# Patient Record
Sex: MALE | Race: WHITE | NOT HISPANIC OR LATINO | Employment: OTHER | URBAN - NONMETROPOLITAN AREA
[De-identification: names, ages, dates, MRNs, and addresses within clinical notes are randomized per-mention and may not be internally consistent; named-entity substitution may affect disease eponyms.]

---

## 2023-03-06 ENCOUNTER — OFFICE VISIT (OUTPATIENT)
Dept: INTERNAL MEDICINE | Facility: OTHER | Age: 53
End: 2023-03-06
Attending: STUDENT IN AN ORGANIZED HEALTH CARE EDUCATION/TRAINING PROGRAM
Payer: COMMERCIAL

## 2023-03-06 ENCOUNTER — TELEPHONE (OUTPATIENT)
Dept: INTERNAL MEDICINE | Facility: OTHER | Age: 53
End: 2023-03-06

## 2023-03-06 VITALS
WEIGHT: 196.6 LBS | SYSTOLIC BLOOD PRESSURE: 118 MMHG | TEMPERATURE: 98.9 F | HEART RATE: 70 BPM | DIASTOLIC BLOOD PRESSURE: 62 MMHG | RESPIRATION RATE: 16 BRPM | OXYGEN SATURATION: 99 %

## 2023-03-06 DIAGNOSIS — Z01.818 PREOPERATIVE EXAMINATION: ICD-10-CM

## 2023-03-06 DIAGNOSIS — E87.5 HYPERKALEMIA: Primary | ICD-10-CM

## 2023-03-06 DIAGNOSIS — J44.9 CHRONIC OBSTRUCTIVE PULMONARY DISEASE, UNSPECIFIED COPD TYPE (H): Primary | ICD-10-CM

## 2023-03-06 DIAGNOSIS — Z12.11 ENCOUNTER FOR SCREENING COLONOSCOPY: ICD-10-CM

## 2023-03-06 DIAGNOSIS — C81.10 NODULAR SCLEROSING HODGKIN'S LYMPHOMA, UNSPECIFIED BODY REGION (H): ICD-10-CM

## 2023-03-06 DIAGNOSIS — E87.5 HYPERKALEMIA: ICD-10-CM

## 2023-03-06 DIAGNOSIS — G62.9 NEUROPATHY: ICD-10-CM

## 2023-03-06 PROBLEM — R74.01 TRANSAMINITIS: Status: ACTIVE | Noted: 2021-03-13

## 2023-03-06 PROBLEM — N18.31 STAGE 3A CHRONIC KIDNEY DISEASE (H): Status: ACTIVE | Noted: 2021-03-13

## 2023-03-06 PROBLEM — D72.810 LYMPHOPENIA: Status: ACTIVE | Noted: 2021-03-13

## 2023-03-06 LAB
ANION GAP SERPL CALCULATED.3IONS-SCNC: 7 MMOL/L (ref 7–15)
ATRIAL RATE - MUSE: 60 BPM
BUN SERPL-MCNC: 17.4 MG/DL (ref 6–20)
CALCIUM SERPL-MCNC: 9.8 MG/DL (ref 8.6–10)
CHLORIDE SERPL-SCNC: 104 MMOL/L (ref 98–107)
CREAT SERPL-MCNC: 1.28 MG/DL (ref 0.67–1.17)
DEPRECATED HCO3 PLAS-SCNC: 30 MMOL/L (ref 22–29)
DIASTOLIC BLOOD PRESSURE - MUSE: NORMAL MMHG
ERYTHROCYTE [DISTWIDTH] IN BLOOD BY AUTOMATED COUNT: 13.4 % (ref 10–15)
GFR SERPL CREATININE-BSD FRML MDRD: 67 ML/MIN/1.73M2
GLUCOSE SERPL-MCNC: 101 MG/DL (ref 70–99)
HCT VFR BLD AUTO: 43 % (ref 40–53)
HGB BLD-MCNC: 14.7 G/DL (ref 13.3–17.7)
INTERPRETATION ECG - MUSE: NORMAL
MCH RBC QN AUTO: 30.1 PG (ref 26.5–33)
MCHC RBC AUTO-ENTMCNC: 34.2 G/DL (ref 31.5–36.5)
MCV RBC AUTO: 88 FL (ref 78–100)
P AXIS - MUSE: 60 DEGREES
PLATELET # BLD AUTO: 226 10E3/UL (ref 150–450)
POTASSIUM SERPL-SCNC: 5.6 MMOL/L (ref 3.4–5.3)
PR INTERVAL - MUSE: 138 MS
QRS DURATION - MUSE: 128 MS
QT - MUSE: 432 MS
QTC - MUSE: 432 MS
R AXIS - MUSE: 94 DEGREES
RBC # BLD AUTO: 4.88 10E6/UL (ref 4.4–5.9)
SODIUM SERPL-SCNC: 141 MMOL/L (ref 136–145)
SYSTOLIC BLOOD PRESSURE - MUSE: NORMAL MMHG
T AXIS - MUSE: 57 DEGREES
VENTRICULAR RATE- MUSE: 60 BPM
WBC # BLD AUTO: 8.9 10E3/UL (ref 4–11)

## 2023-03-06 PROCEDURE — 93005 ELECTROCARDIOGRAM TRACING: CPT | Performed by: STUDENT IN AN ORGANIZED HEALTH CARE EDUCATION/TRAINING PROGRAM

## 2023-03-06 PROCEDURE — G0463 HOSPITAL OUTPT CLINIC VISIT: HCPCS

## 2023-03-06 PROCEDURE — 93010 ELECTROCARDIOGRAM REPORT: CPT | Performed by: INTERNAL MEDICINE

## 2023-03-06 PROCEDURE — 82310 ASSAY OF CALCIUM: CPT | Mod: ZL | Performed by: STUDENT IN AN ORGANIZED HEALTH CARE EDUCATION/TRAINING PROGRAM

## 2023-03-06 PROCEDURE — 85027 COMPLETE CBC AUTOMATED: CPT | Mod: ZL | Performed by: STUDENT IN AN ORGANIZED HEALTH CARE EDUCATION/TRAINING PROGRAM

## 2023-03-06 PROCEDURE — 36415 COLL VENOUS BLD VENIPUNCTURE: CPT | Mod: ZL | Performed by: STUDENT IN AN ORGANIZED HEALTH CARE EDUCATION/TRAINING PROGRAM

## 2023-03-06 PROCEDURE — 99204 OFFICE O/P NEW MOD 45 MIN: CPT | Performed by: STUDENT IN AN ORGANIZED HEALTH CARE EDUCATION/TRAINING PROGRAM

## 2023-03-06 RX ORDER — GABAPENTIN 300 MG/1
1 CAPSULE ORAL 3 TIMES DAILY
COMMUNITY
Start: 2022-05-09 | End: 2023-03-06

## 2023-03-06 RX ORDER — GABAPENTIN 100 MG/1
200-300 CAPSULE ORAL 3 TIMES DAILY
Qty: 540 CAPSULE | Refills: 3 | Status: SHIPPED | OUTPATIENT
Start: 2023-03-06

## 2023-03-06 ASSESSMENT — PAIN SCALES - GENERAL: PAINLEVEL: NO PAIN (0)

## 2023-03-06 NOTE — TELEPHONE ENCOUNTER
----- Message from Js Luna MD sent at 3/6/2023 12:41 PM CST -----  Please call patient with results that his potassium was very elevated. This was likely due to hemolysis of the lab sample but needs to be rechecked. I have placed a repeat potassium draw. He may come in for a lab only appointment. I will let him know results when I see them.     Js Luna MD on 3/6/2023 at 12:40 PM

## 2023-03-06 NOTE — TELEPHONE ENCOUNTER
After verifying patient's name and date of birth, patient was given the below information.   Patient understood.    Ramila Herndon LPN....3/6/2023 3:06 PM

## 2023-03-06 NOTE — PROGRESS NOTES
[facility]  [address]  [place] [address]  Phone: [phone]  Primary Provider: No Ref-Primary, Physician  Pre-op Performing Provider: MADHU GIRON      PREOPERATIVE EVALUATION:  Today's date: 3/6/2023    Primo Tierney is a 53 year old male who presents for a preoperative evaluation.    Surgical Information:  Surgery/Procedure: right ear surgery  Surgery Location: [place]   Surgeon: Dr. Carter   Surgery Date: 3-14-23  Time of Surgery: TBD  Where patient plans to recover: At home with family  Fax number for surgical facility: [phone]    Type of Anesthesia Anticipated: Local with MAC          Assessment & Plan         ICD-10-CM    1. Preoperative examination  Z01.818 CBC W PLT No Diff     EKG 12-lead complete w/read - Clinics     Basic Metabolic Panel     CBC W PLT No Diff     Basic Metabolic Panel      2. Nodular sclerosing Hodgkin's lymphoma, unspecified body region (H)  C81.10       3. Chronic obstructive pulmonary disease, unspecified COPD type (H)  J44.9       4. Neuropathy  G62.9 gabapentin (NEURONTIN) 100 MG capsule      5. Encounter for screening colonoscopy  Z12.11 Colonoscopy Screening  Referral        Preoperative examination: Obtain basic labs as he is not had labs in nearly 1 year.  He has no active cardiopulmonary symptoms.  He may continue taking his gabapentin up until the day of surgery.  EKG demonstrates right bundle branch block but otherwise unremarkable for any evidence of ischemia.  He may proceed to testing without any additional work-up.  He is medically optimized for surgery.    Hodgkin's lymphoma: Has resultant neuropathy from chemotherapy and would like to reduce his gabapentin from 300 mg 3 times daily to 200 to 300 mg 3 times daily.  He was given a new prescription of 100 mg tablets for this.    He is due for colonoscopy.  This was ordered today.    Follow-up with me in approximately 3 to 6 months for annual physical exam.      No  follow-ups on file.    Js Luna MD  [facility]    Addendum: Potassium has normalized on recheck, suspect that he had hemolysis of his initial sample.  Patient is medically optimized for surgery.      Subjective     HPI related to upcoming procedure:     Going to have a ear drum surgery.  Patient had a sinus infection that led to a perforated eardrum.  He is going to have eardrum repair on 3-.    Patient has also recently moved to [facility] from [facility] [place].  He has a history of Hodgkin's lymphoma.  He is status postchemotherapy and stem cell transplant.  He has neuropathy as a result.  He is only on gabapentin.  He does not use any blood thinners.  No chest pain or shortness of breath.  He would like to establish care today as well.    He states he just completed his whole childhood immunization regimen as he just had stents of transplant.  He follows with [facility] for his stem cell transplant and hematology oncology at [facility] in [place].        Preop Questions 3/6/2023   1. Have you ever had a heart attack or stroke? No   2. Have you ever had surgery on your heart or blood vessels, such as a stent placement, a coronary artery bypass, or surgery on an artery in your head, neck, heart, or legs? No   3. Do you have chest pain with activity? No   4. Do you have a history of  heart failure? No   5. Do you currently have a cold, bronchitis or symptoms of other infection? No   6. Do you have a cough, shortness of breath, or wheezing? No   7. Do you or anyone in your family have previous history of blood clots? No   8. Do you or does anyone in your family have a serious bleeding problem such as prolonged bleeding following surgeries or cuts? No   9. Have you ever had problems with anemia or been told to take iron pills? No   10. Have you had any abnormal blood loss such as black, tarry or bloody stools? No   11. Have you ever had a blood transfusion? YES -     11a. Have you ever had a transfusion reaction? No   12. Are you willing to have a blood transfusion if it is medically needed before, during, or after your surgery? Yes   13. Have you or any of your relatives ever had problems with anesthesia? No   14. Do you have sleep apnea, excessive snoring or daytime drowsiness? No   15. Do you have any artifical heart valves or other implanted medical devices like a pacemaker, defibrillator, or continuous glucose monitor? No   16. Do you have artificial joints? No   17. Are you allergic to latex? No       Health Care Directive:  Patient does not have a Health Care Directive or Living Will:     Preoperative Review of :   reviewed - no record of controlled substances prescribed.          Review of Systems  CONSTITUTIONAL: NEGATIVE for fever, chills, change in weight  ENT/MOUTH: NEGATIVE for ear, mouth and throat problems  RESP: NEGATIVE for significant cough or SOB  CV: NEGATIVE for chest pain, palpitations or peripheral edema    There are no problems to display for this patient.     No past medical history on file.  No past surgical history on file.  Current Outpatient Medications   Medication Sig Dispense Refill     gabapentin (NEURONTIN) 300 MG capsule Take 1 capsule by mouth 3 times daily         Allergies   Allergen Reactions     Bendamustine Anaphylaxis     Adhesive Tape Dermatitis and Itching     Use op site dressing for PAC     Atovaquone Itching and Other (See Comments)     Rash. Chills within 3 hrs of first dose     Cephalexin Nausea and Vomiting     Per patient       Ciprofloxacin Diarrhea     Erythromycin Nausea     Levofloxacin Diarrhea     Morphine Nausea and Vomiting     Can take with antinausea meds          Social History     Tobacco Use     Smoking status: Some Days     Types: Cigarettes     Smokeless tobacco: Never   Substance Use Topics     Alcohol use: Not Currently     Family History   Problem Relation Age of Onset     Heart Surgery Mother          x4     Uterine Cancer Sister      Melanoma Maternal Grandmother      History   Drug Use     Types: Marijuana         Objective     /62 (BP Location: Right arm, Patient Position: Sitting, Cuff Size: Adult Regular)   Pulse 70   Temp 98.9  F (37.2  C) (Temporal)   Resp 16   Wt 89.2 kg (196 lb 9.6 oz)   SpO2 99%     Physical Exam  Vitals and nursing note reviewed.   Constitutional:       General: He is not in acute distress.     Appearance: He is well-developed. He is not diaphoretic.   HENT:      Head: Normocephalic and atraumatic.   Eyes:      General: No scleral icterus.     Conjunctiva/sclera: Conjunctivae normal.   Cardiovascular:      Rate and Rhythm: Normal rate and regular rhythm.   Pulmonary:      Effort: Pulmonary effort is normal.      Breath sounds: Normal breath sounds. No stridor. No wheezing or rales.   Abdominal:      Palpations: Abdomen is soft.      Tenderness: There is no abdominal tenderness.   Musculoskeletal:         General: No deformity.      Cervical back: Neck supple.   Lymphadenopathy:      Cervical: No cervical adenopathy.   Skin:     General: Skin is warm and dry.      Coloration: Skin is not jaundiced.      Findings: No rash.   Neurological:      General: No focal deficit present.      Mental Status: He is alert. Mental status is at baseline.   Psychiatric:         Mood and Affect: Mood normal.         Behavior: Behavior normal.           No results for input(s): HGB, PLT, INR, NA, POTASSIUM, CR, A1C in the last 11279 hours.     Diagnostics:  Labs pending at this time.  Results will be reviewed when available.   EKG demonstrates normal sinus rhythm with a right bundle branch block.  No evidence of ischemia at this time.  QTc 432    Revised Cardiac Risk Index (RCRI):  The patient has the following serious cardiovascular risks for perioperative complications:   - No serious cardiac risks = 0 points     RCRI Interpretation: 0 points: Class I (very low risk - 0.4% complication  rate)           Signed Electronically by: Js Luna MD  Copy of this evaluation report is provided to requesting physician.

## 2023-03-06 NOTE — NURSING NOTE
Chief Complaint   Patient presents with     Pre-Op Exam     3-14-23  [place]  Dr. Carter  right ear surgery         Medication reconciliation completed.    FOOD SECURITY SCREENING QUESTIONS:    The next two questions are to help us understand your food security.  If you are feeling you need any assistance in this area, we have resources available to support you today.    Hunger Vital Signs:  Within the past 12 months we worried whether our food would run out before we got money to buy more. Never  Within the past 12 months the food we bought just didn't last and we didn't have money to get more. Never    Initial Pulse 70   Temp 98.9  F (37.2  C) (Temporal)   Resp 16   Wt 89.2 kg (196 lb 9.6 oz)   SpO2 99%  There is no height or weight on file to calculate BMI.       Ramila Herndon LPN .......  3/6/2023  10:23 AM

## 2023-03-07 DIAGNOSIS — Z00.00 HEALTHCARE MAINTENANCE: Primary | ICD-10-CM

## 2023-03-07 NOTE — TELEPHONE ENCOUNTER
Screening Questions for the Scheduling of Screening Colonoscopies   (If Colonoscopy is diagnostic, Provider should review the chart before scheduling.)  Are you younger than 50 or older than 80?  NO  Do you take aspirin or fish oil?  NO (if yes, tell patient to stop 1 week prior to Colonoscopy)  Do you take warfarin (Coumadin), clopidogrel (Plavix), apixaban (Eliquis), dabigatram (Pradaxa), rivaroxaban (Xarelto) or any blood thinner? NO  Do you use oxygen at home?  NO  Do you have kidney disease? NO  Are you on dialysis? NO  Have you had a stroke or heart attack in the last year? NO  Have you had a stent in your heart or any blood vessel in the last year? NO  Have you had a transplant of any organ? NO  Have you had a colonoscopy or upper endoscopy (EGD) before? YES         When?  2015  Date of scheduled Colonoscopy. 04/10/2023  Provider TYLER  Pharmacy [facility]

## 2023-03-08 RX ORDER — BISACODYL 5 MG
TABLET, DELAYED RELEASE (ENTERIC COATED) ORAL
Qty: 2 TABLET | Refills: 0 | Status: ON HOLD | OUTPATIENT
Start: 2023-03-08 | End: 2023-04-10

## 2023-03-08 RX ORDER — POLYETHYLENE GLYCOL 3350, SODIUM CHLORIDE, SODIUM BICARBONATE, POTASSIUM CHLORIDE 420; 11.2; 5.72; 1.48 G/4L; G/4L; G/4L; G/4L
4000 POWDER, FOR SOLUTION ORAL ONCE
Qty: 4000 ML | Refills: 0 | Status: SHIPPED | OUTPATIENT
Start: 2023-03-08 | End: 2023-03-08

## 2023-03-09 ENCOUNTER — LAB (OUTPATIENT)
Dept: LAB | Facility: OTHER | Age: 53
End: 2023-03-09
Attending: STUDENT IN AN ORGANIZED HEALTH CARE EDUCATION/TRAINING PROGRAM
Payer: COMMERCIAL

## 2023-03-09 ENCOUNTER — TELEPHONE (OUTPATIENT)
Dept: INTERNAL MEDICINE | Facility: OTHER | Age: 53
End: 2023-03-09

## 2023-03-09 DIAGNOSIS — E87.5 HYPERKALEMIA: ICD-10-CM

## 2023-03-09 LAB — POTASSIUM SERPL-SCNC: 4.1 MMOL/L (ref 3.4–5.3)

## 2023-03-09 PROCEDURE — 84132 ASSAY OF SERUM POTASSIUM: CPT | Mod: ZL

## 2023-03-09 PROCEDURE — 36415 COLL VENOUS BLD VENIPUNCTURE: CPT | Mod: ZL

## 2023-03-09 NOTE — TELEPHONE ENCOUNTER
After verifying patient's name and date of birth, patient was given the below information.      Ramila Herndon LPN....3/9/2023 4:14 PM

## 2023-03-09 NOTE — TELEPHONE ENCOUNTER
----- Message from Js Luna MD sent at 3/9/2023  2:22 PM CST -----  Please call patient with results that his potassium is normal on recheck and he may proceed with surgery as scheduled.     Js Luna MD on 3/9/2023 at 2:21 PM

## 2023-04-03 DIAGNOSIS — Z94.84 H/O STEM CELL TRANSPLANT (H): Primary | ICD-10-CM

## 2023-04-04 PROBLEM — Z00.00 HEALTHCARE MAINTENANCE: Status: ACTIVE | Noted: 2023-04-04

## 2023-04-07 ENCOUNTER — TELEPHONE (OUTPATIENT)
Dept: INTERNAL MEDICINE | Facility: OTHER | Age: 53
End: 2023-04-07
Payer: COMMERCIAL

## 2023-04-07 ENCOUNTER — MEDICAL CORRESPONDENCE (OUTPATIENT)
Dept: HEALTH INFORMATION MANAGEMENT | Facility: OTHER | Age: 53
End: 2023-04-07
Payer: COMMERCIAL

## 2023-04-07 DIAGNOSIS — C81.10 NODULAR SCLEROSING HODGKIN'S LYMPHOMA, UNSPECIFIED BODY REGION (H): Primary | ICD-10-CM

## 2023-04-07 DIAGNOSIS — R59.0 LYMPHADENOPATHY, HILAR: ICD-10-CM

## 2023-04-07 DIAGNOSIS — D72.810 LYMPHOPENIA: ICD-10-CM

## 2023-04-07 DIAGNOSIS — Z94.84 H/O STEM CELL TRANSPLANT (H): ICD-10-CM

## 2023-04-07 NOTE — TELEPHONE ENCOUNTER
"Done, \"go ahead with the PET only, they will address the CT portion.\" Deisi Brooks RN on 4/7/2023 at 2:42 PM    " CBC/PT/PTT/INR/Type and Screen

## 2023-04-07 NOTE — TELEPHONE ENCOUNTER
Thank you for your help. Orders signed. Could you please contact [facility] to let them know that we can do a PET only, no PET CT and if they want the CT portion in addition to PET it will need to be done elsewhere.     Js Luna MD on 4/7/2023 at 1:27 PM      Will send results to [facility]  Attn: [facility]      They should also be available in Epic.

## 2023-04-07 NOTE — TELEPHONE ENCOUNTER
Priscila Mena, [facility], is reaching out to TALI Odom, to help get this scheduled as requested. Orders pending with comments.     Left message for Radiology Scheduling to confirm [facility] PET CT Skull to Thigh FDG - Nodular sclerosis classical hodgkin lymphoma, lymph nodes of multiple sites, comments: Patient has scheduled appointment (virtual) on 5/8. Please push images and written results to [facility] prior to this appointment.    Potential toxicity of this regimen:    Leukopenia - if WBC < 2.0 or neutrophils ,= 1.0 please call   If platelets ,50,000 please call  Deisi Brooks RN on 4/7/2023 at 10:46 AM

## 2023-04-10 ENCOUNTER — ANESTHESIA (OUTPATIENT)
Dept: SURGERY | Facility: OTHER | Age: 53
End: 2023-04-10
Payer: COMMERCIAL

## 2023-04-10 ENCOUNTER — HOSPITAL ENCOUNTER (OUTPATIENT)
Facility: OTHER | Age: 53
Discharge: HOME | End: 2023-04-10
Attending: SURGERY | Admitting: SURGERY
Payer: COMMERCIAL

## 2023-04-10 ENCOUNTER — ANESTHESIA EVENT (OUTPATIENT)
Dept: SURGERY | Facility: OTHER | Age: 53
End: 2023-04-10
Payer: COMMERCIAL

## 2023-04-10 VITALS
TEMPERATURE: 96.4 F | HEART RATE: 60 BPM | RESPIRATION RATE: 14 BRPM | OXYGEN SATURATION: 98 % | DIASTOLIC BLOOD PRESSURE: 60 MMHG | SYSTOLIC BLOOD PRESSURE: 104 MMHG | WEIGHT: 195 LBS

## 2023-04-10 PROBLEM — K63.5 COLON POLYPS: Status: ACTIVE | Noted: 2023-04-10

## 2023-04-10 PROCEDURE — 45384 COLONOSCOPY W/LESION REMOVAL: CPT | Mod: PT | Performed by: SURGERY

## 2023-04-10 PROCEDURE — 258N000003 HC RX IP 258 OP 636: Performed by: SURGERY

## 2023-04-10 PROCEDURE — 88305 TISSUE EXAM BY PATHOLOGIST: CPT

## 2023-04-10 PROCEDURE — 999N000010 HC STATISTIC ANES STAT CODE-CRNA PER MINUTE: Performed by: SURGERY

## 2023-04-10 PROCEDURE — 45385 COLONOSCOPY W/LESION REMOVAL: CPT | Mod: PT | Performed by: SURGERY

## 2023-04-10 PROCEDURE — 45385 COLONOSCOPY W/LESION REMOVAL: CPT | Performed by: NURSE ANESTHETIST, CERTIFIED REGISTERED

## 2023-04-10 PROCEDURE — 250N000009 HC RX 250: Performed by: NURSE ANESTHETIST, CERTIFIED REGISTERED

## 2023-04-10 PROCEDURE — 45378 DIAGNOSTIC COLONOSCOPY: CPT | Performed by: SURGERY

## 2023-04-10 PROCEDURE — 45384 COLONOSCOPY W/LESION REMOVAL: CPT | Mod: PT,XU | Performed by: SURGERY

## 2023-04-10 PROCEDURE — 45385 COLONOSCOPY W/LESION REMOVAL: CPT | Mod: PT

## 2023-04-10 PROCEDURE — 250N000011 HC RX IP 250 OP 636: Performed by: NURSE ANESTHETIST, CERTIFIED REGISTERED

## 2023-04-10 RX ORDER — NALOXONE HYDROCHLORIDE 0.4 MG/ML
0.4 INJECTION, SOLUTION INTRAMUSCULAR; INTRAVENOUS; SUBCUTANEOUS
Status: CANCELLED | OUTPATIENT
Start: 2023-04-10

## 2023-04-10 RX ORDER — NALOXONE HYDROCHLORIDE 0.4 MG/ML
0.2 INJECTION, SOLUTION INTRAMUSCULAR; INTRAVENOUS; SUBCUTANEOUS
Status: CANCELLED | OUTPATIENT
Start: 2023-04-10

## 2023-04-10 RX ORDER — SODIUM CHLORIDE, SODIUM LACTATE, POTASSIUM CHLORIDE, CALCIUM CHLORIDE 600; 310; 30; 20 MG/100ML; MG/100ML; MG/100ML; MG/100ML
INJECTION, SOLUTION INTRAVENOUS CONTINUOUS
Status: DISCONTINUED | OUTPATIENT
Start: 2023-04-10 | End: 2023-04-10 | Stop reason: HOSPADM

## 2023-04-10 RX ORDER — LIDOCAINE 40 MG/G
CREAM TOPICAL
Status: DISCONTINUED | OUTPATIENT
Start: 2023-04-10 | End: 2023-04-10 | Stop reason: HOSPADM

## 2023-04-10 RX ORDER — ACETAMINOPHEN 500 MG
500 TABLET ORAL EVERY 6 HOURS PRN
COMMUNITY

## 2023-04-10 RX ORDER — PROPOFOL 10 MG/ML
INJECTION, EMULSION INTRAVENOUS PRN
Status: DISCONTINUED | OUTPATIENT
Start: 2023-04-10 | End: 2023-04-10

## 2023-04-10 RX ORDER — SODIUM CHLORIDE, SODIUM LACTATE, POTASSIUM CHLORIDE, CALCIUM CHLORIDE 600; 310; 30; 20 MG/100ML; MG/100ML; MG/100ML; MG/100ML
INJECTION, SOLUTION INTRAVENOUS CONTINUOUS
Status: CANCELLED | OUTPATIENT
Start: 2023-04-10

## 2023-04-10 RX ORDER — LIDOCAINE HYDROCHLORIDE 20 MG/ML
INJECTION, SOLUTION INFILTRATION; PERINEURAL PRN
Status: DISCONTINUED | OUTPATIENT
Start: 2023-04-10 | End: 2023-04-10

## 2023-04-10 RX ORDER — ONDANSETRON 2 MG/ML
4 INJECTION INTRAMUSCULAR; INTRAVENOUS
Status: DISCONTINUED | OUTPATIENT
Start: 2023-04-10 | End: 2023-04-10 | Stop reason: HOSPADM

## 2023-04-10 RX ORDER — POLYETHYLENE GLYCOL 3350, SODIUM CHLORIDE, SODIUM BICARBONATE, POTASSIUM CHLORIDE 420; 11.2; 5.72; 1.48 G/4L; G/4L; G/4L; G/4L
420 POWDER, FOR SOLUTION ORAL ONCE
Status: ON HOLD | COMMUNITY
Start: 2023-03-09 | End: 2023-04-10

## 2023-04-10 RX ORDER — PROPOFOL 10 MG/ML
INJECTION, EMULSION INTRAVENOUS CONTINUOUS PRN
Status: DISCONTINUED | OUTPATIENT
Start: 2023-04-10 | End: 2023-04-10

## 2023-04-10 RX ORDER — FLUMAZENIL 0.1 MG/ML
0.2 INJECTION, SOLUTION INTRAVENOUS
Status: CANCELLED | OUTPATIENT
Start: 2023-04-10 | End: 2023-04-10

## 2023-04-10 RX ADMIN — PROPOFOL 60 MG: 10 INJECTION, EMULSION INTRAVENOUS at 10:36

## 2023-04-10 RX ADMIN — SODIUM CHLORIDE, POTASSIUM CHLORIDE, SODIUM LACTATE AND CALCIUM CHLORIDE: 600; 310; 30; 20 INJECTION, SOLUTION INTRAVENOUS at 10:04

## 2023-04-10 RX ADMIN — LIDOCAINE HYDROCHLORIDE 40 MG: 20 INJECTION, SOLUTION INFILTRATION; PERINEURAL at 10:36

## 2023-04-10 RX ADMIN — PROPOFOL 140 MCG/KG/MIN: 10 INJECTION, EMULSION INTRAVENOUS at 10:36

## 2023-04-10 ASSESSMENT — COPD QUESTIONNAIRES: COPD: 1

## 2023-04-10 ASSESSMENT — ACTIVITIES OF DAILY LIVING (ADL): ADLS_ACUITY_SCORE: 35

## 2023-04-10 ASSESSMENT — LIFESTYLE VARIABLES: TOBACCO_USE: 1

## 2023-04-10 NOTE — DISCHARGE INSTRUCTIONS
[facility]  Adult Discharge Orders & Instructions    ________________________________________________________________          For 12 hours after surgery  Get plenty of rest.  A responsible adult must stay with you for at least 12 hours after you leave the hospital.   You may feel lightheaded.  IF so, sit for a few minutes before standing.  Have someone help you get up.   You may have a slight fever. Call the doctor if your fever is over 101 F (38.3 C) (taken under the tongue) or lasts longer than 24 hours.  You may have a dry mouth, a sore throat, muscle aches or trouble sleeping.  These should go away after 24 hours.  Do not make important or legal decisions.  6.   Do not drive or use heavy equipment.  If you have weakness or tingling, don't drive or use heavy equipment until this feeling goes away.    To contact a doctor, call   [phone]_______________________

## 2023-04-10 NOTE — OP NOTE
PROCEDURE NOTE    DATE OF SERVICE: 4/10/2023    SURGEON: Reilly Edmond MD    PRE-OP DIAGNOSIS:      History of Polyps      POST-OP DIAGNOSIS:  Same  Polyps at TC and rectum    PROCEDURE:       Colonoscopy with snare polypectomy and hot biopsy        ANESTHESIA:  JAREK Bangura CRNA    INDICATION FOR THE PROCEDURE: The patient is a 53 year old male with h/o polyps . The patient has no other complaints  . After explaining the risks to include bleeding, perforation, potential inability toreach the cecum, the patient wished to proceed.    PROCEDURE:After adequate sedation, the patient was in the left lateral decubitus position.  Rectal exam was performed.  There was normal tone and no palpable masses .  The colonoscope was introduced into the rectum and advanced to the cecum with Mild difficulty.  The patient's prep was good.  The terminal cecum was reached.  The cecum, ascending, transverse, descending and sigmoid colon was with small 0.4 cm polyp in proximal TC that was hot biopsied and destroyed and 0.7 cm slightly raised polyp in distal TC that was completely removed by snare .  The scope was retroflexed in the rectum.  The rectum was remarkable  for diminutive polyps that were hot biopsied and destroyed   .  The scope was straightened and removed.  The patient tolerated the procedure well.     ESTIMATED BLOOD LOSS: none    COMPLICATIONS:  None    TISSUE REMOVED:  Yes    RECOMMEND:      Follow-up pending pathology      Reilly Edmond MD FACS

## 2023-04-10 NOTE — ANESTHESIA PREPROCEDURE EVALUATION
Anesthesia Pre-Procedure Evaluation    Patient: Primo Tierney   MRN: [id number] : 1970        Procedure : Procedure(s):  Colonoscopy          Past Medical History:   Diagnosis Date     History of peripheral stem cell transplant (H)      Hodgkin lymphoma, nodular sclerosis (H)      Neuropathy       Past Surgical History:   Procedure Laterality Date     BIOPSY/REMOVAL, LYMPH NODE(S)      x2     CHOLECYSTECTOMY       PORTACATH PLACEMENT      Has been removed. Was for chemo      Allergies   Allergen Reactions     Bendamustine Anaphylaxis     Adhesive Tape Dermatitis and Itching     Use op site dressing for PAC     Atovaquone Itching and Other (See Comments)     Rash. Chills within 3 hrs of first dose     Cephalexin Nausea and Vomiting     Per patient       Ciprofloxacin Diarrhea     Erythromycin Nausea     Levofloxacin Diarrhea     Morphine Nausea and Vomiting     Can take with antinausea meds        Social History     Tobacco Use     Smoking status: Some Days     Packs/day: 0.25     Types: Cigarettes     Smokeless tobacco: Never   Vaping Use     Vaping status: Never Used   Substance Use Topics     Alcohol use: Not Currently      Wt Readings from Last 1 Encounters:   04/10/23 88.5 kg (195 lb)        Anesthesia Evaluation   Pt has had prior anesthetic.     No history of anesthetic complications       ROS/MED HX  ENT/Pulmonary:     (+) tobacco use, Current use, COPD,     Neurologic:     (+) peripheral neuropathy,     Cardiovascular:     (+) -----Irregular Heartbeat/Palpitations,     METS/Exercise Tolerance: 4 - Raking leaves, gardening    Hematologic: Comments: Hx Stem Cell Transplant    (+) history of blood transfusion, no previous transfusion reaction,     Musculoskeletal:  - neg musculoskeletal ROS     GI/Hepatic:     (+) bowel prep,     Renal/Genitourinary:     (+) renal disease, type: CRI, Pt does not require dialysis,     Endo:     (+) Obesity,     Psychiatric/Substance Use:  - neg psychiatric ROS      Infectious Disease:  - neg infectious disease ROS     Malignancy:   (+) Malignancy, History of Lymphoma/Leukemia.    Other:  - neg other ROS          Physical Exam    Airway        Mallampati: II   TM distance: > 3 FB   Neck ROM: full   Mouth opening: > 3 cm    Respiratory Devices and Support         Dental     Comment: Upper and lower dentures are in place. Pt wishes to keep them in during the procedure. He understands the risks associated with their dislodgment and accepts the risks.     (+) Edentulous      Cardiovascular   cardiovascular exam normal       Rhythm and rate: regular and normal     Pulmonary   pulmonary exam normal        breath sounds clear to auscultation           OUTSIDE LABS:  CBC:   Lab Results   Component Value Date    WBC 8.9 03/06/2023    HGB 14.7 03/06/2023    HCT 43.0 03/06/2023     03/06/2023     BMP:   Lab Results   Component Value Date     03/06/2023    POTASSIUM 4.1 03/09/2023    POTASSIUM 5.6 (H) 03/06/2023    CHLORIDE 104 03/06/2023    CO2 30 (H) 03/06/2023    BUN 17.4 03/06/2023    CR 1.28 (H) 03/06/2023     (H) 03/06/2023     COAGS: No results found for: PTT, INR, FIBR  POC: No results found for: BGM, HCG, HCGS  HEPATIC: No results found for: ALBUMIN, PROTTOTAL, ALT, AST, GGT, ALKPHOS, BILITOTAL, BILIDIRECT, JM  OTHER:   Lab Results   Component Value Date    TRINA 9.8 03/06/2023       Anesthesia Plan    ASA Status:  3   NPO Status:  NPO Appropriate    Anesthesia Type: MAC.              Consents    Anesthesia Plan(s) and associated risks, benefits, and realistic alternatives discussed. Questions answered and patient/representative(s) expressed understanding.     - Discussed: Risks, Benefits and Alternatives for BOTH SEDATION and the PROCEDURE were discussed     - Discussed with:  Patient      - Extended Intubation/Ventilatory Support Discussed: No.      - Patient is DNR/DNI Status: No    Use of blood products discussed: No .     Postoperative Care             Comments:                DARA Bridges CRNA

## 2023-04-10 NOTE — H&P
History and Physical    CHIEF COMPLAINT / REASON FOR PROCEDURE:  H/o polyps    PERTINENT HISTORY   Patient is a 53 year old male who presents today for colonoscopy for h/o polyps.   Last colonoscopy about 4 years ago.   Patient has no complaints.    Past Medical History:   Diagnosis Date     History of peripheral stem cell transplant (H)      Hodgkin lymphoma, nodular sclerosis (H)      Neuropathy      Past Surgical History:   Procedure Laterality Date     BIOPSY/REMOVAL, LYMPH NODE(S)      x2     CHOLECYSTECTOMY       PORTACATH PLACEMENT      Has been removed. Was for chemo       Bleeding tendencies:  No    ALLERGIES/SENSITIVITIES:   Allergies   Allergen Reactions     Bendamustine Anaphylaxis     Adhesive Tape Dermatitis and Itching     Use op site dressing for PAC     Atovaquone Itching and Other (See Comments)     Rash. Chills within 3 hrs of first dose     Cephalexin Nausea and Vomiting     Per patient       Ciprofloxacin Diarrhea     Erythromycin Nausea     Levofloxacin Diarrhea     Morphine Nausea and Vomiting     Can take with antinausea meds          CURRENT MEDICATIONS:    Prior to Admission medications    Medication Sig Start Date End Date Taking? Authorizing Provider   gabapentin (NEURONTIN) 100 MG capsule Take 2-3 capsules (200-300 mg) by mouth 3 times daily 3/6/23  Yes Js Luna MD   acetaminophen (TYLENOL) 500 MG tablet Take 500 mg by mouth every 6 hours as needed    Reported, Patient       Physical Exam:  /66   Pulse 77   Temp 97.6  F (36.4  C) (Tympanic)   Resp 16   Wt 88.5 kg (195 lb)   SpO2 97%   EXAM:  Chest/Respiratory Exam: Normal - Clear to auscultation without rales, rhonchi, or wheezing.  Cardiovascular Exam: normal, regular rate and rhythm        PLAN: COLONOSCOPY .  Patient understands risks of bleeding, perforation, potential inability to reach cecum, aspiration and wishes to proceed. MAC needed for ASA III.

## 2023-04-10 NOTE — ANESTHESIA POSTPROCEDURE EVALUATION
Patient: Primo Tierney    Procedure: Procedure(s):  Colonoscopy       Anesthesia Type:  MAC    Note:  Disposition: Outpatient   Postop Pain Control: Uneventful            Sign Out: Well controlled pain   PONV: No   Neuro/Psych: Uneventful            Sign Out: Acceptable/Baseline neuro status   Airway/Respiratory: Uneventful            Sign Out: Acceptable/Baseline resp. status   CV/Hemodynamics: Uneventful            Sign Out: Acceptable CV status; No obvious hypovolemia; No obvious fluid overload   Other NRE: NONE   DID A NON-ROUTINE EVENT OCCUR?            Last vitals:  Vitals Value Taken Time   /60 04/10/23 1130   Temp 96.4  F (35.8  C) 04/10/23 1107   Pulse 60 04/10/23 1130   Resp 16 04/10/23 1115   SpO2 97 % 04/10/23 1131   Vitals shown include unvalidated device data.    Electronically Signed By: DARA MARTIN CRNA  April 10, 2023  11:44 AM

## 2023-04-10 NOTE — ANESTHESIA CARE TRANSFER NOTE
Patient: Primo Tierney    Procedure: Procedure(s):  Colonoscopy       Diagnosis: Screening for colon cancer [Z12.11]  Diagnosis Additional Information: No value filed.    Anesthesia Type:   MAC     Note:    Oropharynx: oropharynx clear of all foreign objects  Level of Consciousness: drowsy  Oxygen Supplementation: room air    Independent Airway: airway patency satisfactory and stable    Vital Signs Stable: post-procedure vital signs reviewed and stable  Report to RN Given: handoff report given  Patient transferred to: Phase II    Handoff Report: Identifed the Patient, Identified the Reponsible Provider, Reviewed the pertinent medical history, Discussed the surgical course, Reviewed Intra-OP anesthesia mangement and issues during anesthesia, Set expectations for post-procedure period and Allowed opportunity for questions and acknowledgement of understanding      Vitals:  Vitals Value Taken Time   BP     Temp     Pulse     Resp     SpO2         Electronically Signed By: DARA MARTIN CRNA  April 10, 2023  11:06 AM

## 2023-04-10 NOTE — OR NURSING
Michael has been discharged to home at 1142 via private car accompanied by mom    Written discharge instructions were provided to patient and mom.  Prescriptions were none.  Patient states their pain is denies, and denies any nausea or dizziness upon discharge.    Patient and adult caring for them verbalize understanding of discharge instructions including no driving until tomorrow and no longer taking narcotic pain medications - no operating mechanical equipment and no making any important decisions.They understand reason for discharge, and necessary follow-up appointments.

## 2023-04-11 PROBLEM — Z86.0101 H/O ADENOMATOUS POLYP OF COLON: Status: ACTIVE | Noted: 2023-04-10

## 2023-04-11 PROBLEM — Z00.00 HEALTHCARE MAINTENANCE: Status: RESOLVED | Noted: 2023-04-04 | Resolved: 2023-04-11

## 2023-04-11 LAB
PATH REPORT.COMMENTS IMP SPEC: NORMAL
PATH REPORT.FINAL DX SPEC: NORMAL
PATH REPORT.RELEVANT HX SPEC: NORMAL
PHOTO IMAGE: NORMAL

## 2023-04-18 ENCOUNTER — TELEPHONE (OUTPATIENT)
Dept: PET IMAGING | Facility: HOSPITAL | Age: 53
End: 2023-04-18

## 2023-04-19 ENCOUNTER — HOSPITAL ENCOUNTER (OUTPATIENT)
Dept: PET IMAGING | Facility: HOSPITAL | Age: 53
Discharge: HOME | End: 2023-04-19
Attending: STUDENT IN AN ORGANIZED HEALTH CARE EDUCATION/TRAINING PROGRAM | Admitting: STUDENT IN AN ORGANIZED HEALTH CARE EDUCATION/TRAINING PROGRAM
Payer: COMMERCIAL

## 2023-04-19 DIAGNOSIS — C81.10 NODULAR SCLEROSING HODGKIN'S LYMPHOMA, UNSPECIFIED BODY REGION (H): ICD-10-CM

## 2023-04-19 DIAGNOSIS — D72.810 LYMPHOPENIA: ICD-10-CM

## 2023-04-19 DIAGNOSIS — Z94.84 H/O STEM CELL TRANSPLANT (H): ICD-10-CM

## 2023-04-19 DIAGNOSIS — R59.0 LYMPHADENOPATHY, HILAR: ICD-10-CM

## 2023-04-19 PROCEDURE — 343N000001 HC RX 343: Performed by: STUDENT IN AN ORGANIZED HEALTH CARE EDUCATION/TRAINING PROGRAM

## 2023-04-19 PROCEDURE — 78815 PET IMAGE W/CT SKULL-THIGH: CPT | Mod: PS

## 2023-04-19 PROCEDURE — A9552 F18 FDG: HCPCS | Performed by: STUDENT IN AN ORGANIZED HEALTH CARE EDUCATION/TRAINING PROGRAM

## 2023-04-19 RX ADMIN — FLUDEOXYGLUCOSE F-18 12.37 MILLICURIE: 500 INJECTION, SOLUTION INTRAVENOUS at 07:50

## 2023-05-05 ENCOUNTER — MEDICAL CORRESPONDENCE (OUTPATIENT)
Dept: HEALTH INFORMATION MANAGEMENT | Facility: OTHER | Age: 53
End: 2023-05-05
Payer: COMMERCIAL

## 2023-06-01 ENCOUNTER — HEALTH MAINTENANCE LETTER (OUTPATIENT)
Age: 53
End: 2023-06-01

## 2024-05-06 DIAGNOSIS — C81.18 HODGKIN'S DISEASE, NODULAR SCLEROSIS OF LYMPH NODES OF MULTIPLE SITES (H): ICD-10-CM

## 2024-05-06 DIAGNOSIS — C81.10 NODULAR SCLEROSING HODGKIN'S LYMPHOMA, UNSPECIFIED BODY REGION (H): Primary | ICD-10-CM

## 2024-05-06 DIAGNOSIS — Z94.84 H/O STEM CELL TRANSPLANT (H): ICD-10-CM

## 2024-05-07 ENCOUNTER — TELEPHONE (OUTPATIENT)
Dept: INTERNAL MEDICINE | Facility: OTHER | Age: 54
End: 2024-05-07
Payer: COMMERCIAL

## 2024-05-07 NOTE — TELEPHONE ENCOUNTER
After verifying patient name and , patient notified that required labs and imaging have been ordered to be done prior to his appointment at the [place] 2024. Patient stated understanding and has been called and scheduled imaging and will call to set up lab only. No further action required.     Johanna Watson LPN on 2024 at 1:03 PM   Ext. [phone]

## 2024-06-09 ENCOUNTER — HEALTH MAINTENANCE LETTER (OUTPATIENT)
Age: 54
End: 2024-06-09

## 2024-06-12 ENCOUNTER — LAB (OUTPATIENT)
Dept: LAB | Facility: OTHER | Age: 54
End: 2024-06-12
Attending: STUDENT IN AN ORGANIZED HEALTH CARE EDUCATION/TRAINING PROGRAM
Payer: COMMERCIAL

## 2024-06-12 ENCOUNTER — HOSPITAL ENCOUNTER (OUTPATIENT)
Dept: PET IMAGING | Facility: OTHER | Age: 54
Discharge: HOME | End: 2024-06-12
Attending: STUDENT IN AN ORGANIZED HEALTH CARE EDUCATION/TRAINING PROGRAM
Payer: COMMERCIAL

## 2024-06-12 DIAGNOSIS — Z94.84 H/O STEM CELL TRANSPLANT (H): ICD-10-CM

## 2024-06-12 DIAGNOSIS — C81.18 HODGKIN'S DISEASE, NODULAR SCLEROSIS OF LYMPH NODES OF MULTIPLE SITES (H): ICD-10-CM

## 2024-06-12 DIAGNOSIS — C81.10 NODULAR SCLEROSING HODGKIN'S LYMPHOMA, UNSPECIFIED BODY REGION (H): ICD-10-CM

## 2024-06-12 LAB
ALBUMIN SERPL BCG-MCNC: 4.7 G/DL (ref 3.5–5.2)
ALP SERPL-CCNC: 79 U/L (ref 40–150)
ALT SERPL W P-5'-P-CCNC: 24 U/L (ref 0–70)
ANION GAP SERPL CALCULATED.3IONS-SCNC: 11 MMOL/L (ref 7–15)
AST SERPL W P-5'-P-CCNC: 25 U/L (ref 0–45)
BASOPHILS # BLD AUTO: 0 10E3/UL (ref 0–0.2)
BASOPHILS NFR BLD AUTO: 1 %
BILIRUB DIRECT SERPL-MCNC: <0.2 MG/DL (ref 0–0.3)
BILIRUB SERPL-MCNC: 0.4 MG/DL
BILIRUB SERPL-MCNC: 0.4 MG/DL
BUN SERPL-MCNC: 20.1 MG/DL (ref 6–20)
CALCIUM SERPL-MCNC: 9.5 MG/DL (ref 8.6–10)
CHLORIDE SERPL-SCNC: 102 MMOL/L (ref 98–107)
CREAT SERPL-MCNC: 1.33 MG/DL (ref 0.67–1.17)
DEPRECATED HCO3 PLAS-SCNC: 25 MMOL/L (ref 22–29)
EGFRCR SERPLBLD CKD-EPI 2021: 64 ML/MIN/1.73M2
EOSINOPHIL # BLD AUTO: 0.1 10E3/UL (ref 0–0.7)
EOSINOPHIL NFR BLD AUTO: 2 %
ERYTHROCYTE [DISTWIDTH] IN BLOOD BY AUTOMATED COUNT: 13.1 % (ref 10–15)
GLUCOSE SERPL-MCNC: 93 MG/DL (ref 70–99)
HCT VFR BLD AUTO: 44 % (ref 40–53)
HGB BLD-MCNC: 14.7 G/DL (ref 13.3–17.7)
IMM GRANULOCYTES # BLD: 0 10E3/UL
IMM GRANULOCYTES NFR BLD: 1 %
LDH SERPL L TO P-CCNC: 185 U/L (ref 0–250)
LYMPHOCYTES # BLD AUTO: 1.3 10E3/UL (ref 0.8–5.3)
LYMPHOCYTES NFR BLD AUTO: 19 %
MCH RBC QN AUTO: 29.8 PG (ref 26.5–33)
MCHC RBC AUTO-ENTMCNC: 33.4 G/DL (ref 31.5–36.5)
MCV RBC AUTO: 89 FL (ref 78–100)
MONOCYTES # BLD AUTO: 0.5 10E3/UL (ref 0–1.3)
MONOCYTES NFR BLD AUTO: 8 %
NEUTROPHILS # BLD AUTO: 4.7 10E3/UL (ref 1.6–8.3)
NEUTROPHILS NFR BLD AUTO: 70 %
NRBC # BLD AUTO: 0 10E3/UL
NRBC BLD AUTO-RTO: 0 /100
PLATELET # BLD AUTO: 244 10E3/UL (ref 150–450)
POTASSIUM SERPL-SCNC: 4.4 MMOL/L (ref 3.4–5.3)
PROT SERPL-MCNC: 6.7 G/DL (ref 6.4–8.3)
RBC # BLD AUTO: 4.93 10E6/UL (ref 4.4–5.9)
SODIUM SERPL-SCNC: 138 MMOL/L (ref 135–145)
WBC # BLD AUTO: 6.8 10E3/UL (ref 4–11)

## 2024-06-12 PROCEDURE — 82248 BILIRUBIN DIRECT: CPT | Mod: ZL

## 2024-06-12 PROCEDURE — 36415 COLL VENOUS BLD VENIPUNCTURE: CPT | Mod: ZL

## 2024-06-12 PROCEDURE — 83615 LACTATE (LD) (LDH) ENZYME: CPT | Mod: ZL

## 2024-06-12 PROCEDURE — 85004 AUTOMATED DIFF WBC COUNT: CPT | Mod: ZL

## 2024-06-12 PROCEDURE — A9552 F18 FDG: HCPCS | Performed by: STUDENT IN AN ORGANIZED HEALTH CARE EDUCATION/TRAINING PROGRAM

## 2024-06-12 PROCEDURE — 80053 COMPREHEN METABOLIC PANEL: CPT | Mod: ZL

## 2024-06-12 PROCEDURE — 78815 PET IMAGE W/CT SKULL-THIGH: CPT | Mod: PS

## 2024-06-12 PROCEDURE — 343N000001 HC RX 343: Performed by: STUDENT IN AN ORGANIZED HEALTH CARE EDUCATION/TRAINING PROGRAM

## 2024-06-12 RX ORDER — FLUDEOXYGLUCOSE F 18 200 MCI/ML
11.44 INJECTION, SOLUTION INTRAVENOUS ONCE
Status: COMPLETED | OUTPATIENT
Start: 2024-06-12 | End: 2024-06-12

## 2024-06-12 RX ADMIN — FLUDEOXYGLUCOSE F 18 11.44 MILLICURIE: 200 INJECTION, SOLUTION INTRAVENOUS at 16:13

## 2025-01-23 ENCOUNTER — OFFICE VISIT (OUTPATIENT)
Dept: PEDIATRICS | Facility: OTHER | Age: 55
End: 2025-01-23
Attending: INTERNAL MEDICINE
Payer: COMMERCIAL

## 2025-01-23 VITALS
WEIGHT: 198.8 LBS | RESPIRATION RATE: 16 BRPM | DIASTOLIC BLOOD PRESSURE: 70 MMHG | SYSTOLIC BLOOD PRESSURE: 120 MMHG | TEMPERATURE: 97.1 F | HEIGHT: 67 IN | HEART RATE: 74 BPM | BODY MASS INDEX: 31.2 KG/M2 | OXYGEN SATURATION: 96 %

## 2025-01-23 DIAGNOSIS — E66.09 NON MORBID OBESITY DUE TO EXCESS CALORIES: ICD-10-CM

## 2025-01-23 DIAGNOSIS — Z12.5 SCREENING FOR PROSTATE CANCER: ICD-10-CM

## 2025-01-23 DIAGNOSIS — F17.200 NICOTINE DEPENDENCE, UNCOMPLICATED, UNSPECIFIED NICOTINE PRODUCT TYPE: ICD-10-CM

## 2025-01-23 DIAGNOSIS — E78.2 MIXED HYPERLIPIDEMIA: ICD-10-CM

## 2025-01-23 DIAGNOSIS — C81.18 HODGKIN'S DISEASE, NODULAR SCLEROSIS OF LYMPH NODES OF MULTIPLE SITES (H): ICD-10-CM

## 2025-01-23 DIAGNOSIS — T45.1X5A CHEMOTHERAPY-INDUCED NEUROPATHY: ICD-10-CM

## 2025-01-23 DIAGNOSIS — H50.9 STRABISMUS: ICD-10-CM

## 2025-01-23 DIAGNOSIS — J44.9 CHRONIC OBSTRUCTIVE PULMONARY DISEASE, UNSPECIFIED COPD TYPE (H): ICD-10-CM

## 2025-01-23 DIAGNOSIS — G62.0 CHEMOTHERAPY-INDUCED NEUROPATHY: ICD-10-CM

## 2025-01-23 DIAGNOSIS — Z00.00 ROUTINE GENERAL MEDICAL EXAMINATION AT A HEALTH CARE FACILITY: Primary | ICD-10-CM

## 2025-01-23 DIAGNOSIS — N18.31 STAGE 3A CHRONIC KIDNEY DISEASE (H): ICD-10-CM

## 2025-01-23 DIAGNOSIS — Z11.4 SCREENING FOR HIV (HUMAN IMMUNODEFICIENCY VIRUS): ICD-10-CM

## 2025-01-23 DIAGNOSIS — Z11.59 NEED FOR HEPATITIS C SCREENING TEST: ICD-10-CM

## 2025-01-23 DIAGNOSIS — C81.10 NODULAR SCLEROSING HODGKIN'S LYMPHOMA, UNSPECIFIED BODY REGION (H): ICD-10-CM

## 2025-01-23 DIAGNOSIS — Z76.89 ENCOUNTER TO ESTABLISH CARE: ICD-10-CM

## 2025-01-23 DIAGNOSIS — J44.9 COPD, MILD (H): ICD-10-CM

## 2025-01-23 DIAGNOSIS — Z94.84 H/O STEM CELL TRANSPLANT (H): ICD-10-CM

## 2025-01-23 DIAGNOSIS — Z13.220 LIPID SCREENING: ICD-10-CM

## 2025-01-23 DIAGNOSIS — R73.09 ELEVATED GLUCOSE LEVEL: ICD-10-CM

## 2025-01-23 PROBLEM — R74.01 TRANSAMINITIS: Status: RESOLVED | Noted: 2021-03-13 | Resolved: 2025-01-23

## 2025-01-23 LAB
ALBUMIN UR-MCNC: NEGATIVE MG/DL
ANION GAP SERPL CALCULATED.3IONS-SCNC: 11 MMOL/L (ref 7–15)
APPEARANCE UR: CLEAR
BILIRUB UR QL STRIP: NEGATIVE
BUN SERPL-MCNC: 19.2 MG/DL (ref 6–20)
CALCIUM SERPL-MCNC: 9.3 MG/DL (ref 8.8–10.4)
CHLORIDE SERPL-SCNC: 101 MMOL/L (ref 98–107)
CHOLEST SERPL-MCNC: 215 MG/DL
COLOR UR AUTO: NORMAL
CREAT SERPL-MCNC: 1.29 MG/DL (ref 0.67–1.17)
CREAT UR-MCNC: 113 MG/DL
EGFRCR SERPLBLD CKD-EPI 2021: 66 ML/MIN/1.73M2
EST. AVERAGE GLUCOSE BLD GHB EST-MCNC: 114 MG/DL
FASTING STATUS PATIENT QL REPORTED: ABNORMAL
FASTING STATUS PATIENT QL REPORTED: ABNORMAL
GLUCOSE SERPL-MCNC: 110 MG/DL (ref 70–99)
GLUCOSE UR STRIP-MCNC: NEGATIVE MG/DL
HBA1C MFR BLD: 5.6 %
HCO3 SERPL-SCNC: 26 MMOL/L (ref 22–29)
HDLC SERPL-MCNC: 41 MG/DL
HGB UR QL STRIP: NEGATIVE
KETONES UR STRIP-MCNC: NEGATIVE MG/DL
LDLC SERPL CALC-MCNC: 125 MG/DL
LEUKOCYTE ESTERASE UR QL STRIP: NEGATIVE
MICROALBUMIN UR-MCNC: <12 MG/L
MICROALBUMIN/CREAT UR: NORMAL MG/G{CREAT}
NITRATE UR QL: NEGATIVE
NONHDLC SERPL-MCNC: 174 MG/DL
PH UR STRIP: 5.5 [PH] (ref 5–9)
POTASSIUM SERPL-SCNC: 4.1 MMOL/L (ref 3.4–5.3)
PSA SERPL DL<=0.01 NG/ML-MCNC: 1.31 NG/ML (ref 0–3.5)
SODIUM SERPL-SCNC: 138 MMOL/L (ref 135–145)
SP GR UR STRIP: 1.02 (ref 1–1.03)
TRIGL SERPL-MCNC: 246 MG/DL
UROBILINOGEN UR STRIP-MCNC: NORMAL MG/DL

## 2025-01-23 PROCEDURE — 82465 ASSAY BLD/SERUM CHOLESTEROL: CPT | Mod: ZL | Performed by: INTERNAL MEDICINE

## 2025-01-23 PROCEDURE — 80048 BASIC METABOLIC PNL TOTAL CA: CPT | Mod: ZL | Performed by: INTERNAL MEDICINE

## 2025-01-23 PROCEDURE — 82043 UR ALBUMIN QUANTITATIVE: CPT | Mod: ZL | Performed by: INTERNAL MEDICINE

## 2025-01-23 PROCEDURE — 81003 URINALYSIS AUTO W/O SCOPE: CPT | Mod: ZL | Performed by: INTERNAL MEDICINE

## 2025-01-23 PROCEDURE — 99406 BEHAV CHNG SMOKING 3-10 MIN: CPT | Performed by: INTERNAL MEDICINE

## 2025-01-23 PROCEDURE — 36415 COLL VENOUS BLD VENIPUNCTURE: CPT | Mod: ZL | Performed by: INTERNAL MEDICINE

## 2025-01-23 PROCEDURE — 82570 ASSAY OF URINE CREATININE: CPT | Mod: ZL | Performed by: INTERNAL MEDICINE

## 2025-01-23 PROCEDURE — G0463 HOSPITAL OUTPT CLINIC VISIT: HCPCS | Mod: 25

## 2025-01-23 PROCEDURE — 82565 ASSAY OF CREATININE: CPT | Mod: ZL | Performed by: INTERNAL MEDICINE

## 2025-01-23 PROCEDURE — 83036 HEMOGLOBIN GLYCOSYLATED A1C: CPT | Mod: ZL | Performed by: INTERNAL MEDICINE

## 2025-01-23 PROCEDURE — G0103 PSA SCREENING: HCPCS | Mod: ZL | Performed by: INTERNAL MEDICINE

## 2025-01-23 RX ORDER — ATORVASTATIN CALCIUM 10 MG/1
10 TABLET, FILM COATED ORAL DAILY
Qty: 90 TABLET | Refills: 4 | Status: SHIPPED | OUTPATIENT
Start: 2025-01-23

## 2025-01-23 SDOH — HEALTH STABILITY: PHYSICAL HEALTH
ON AVERAGE, HOW MANY DAYS PER WEEK DO YOU ENGAGE IN MODERATE TO STRENUOUS EXERCISE (LIKE A BRISK WALK)?: PATIENT DECLINED

## 2025-01-23 ASSESSMENT — PAIN SCALES - GENERAL: PAINLEVEL_OUTOF10: NO PAIN (0)

## 2025-01-23 ASSESSMENT — SOCIAL DETERMINANTS OF HEALTH (SDOH): HOW OFTEN DO YOU GET TOGETHER WITH FRIENDS OR RELATIVES?: TWICE A WEEK

## 2025-01-23 NOTE — PROGRESS NOTES
Preventive Care Visit  [facility]  Lizandro Hamm MD, Internal Medicine  Jan 23, 2025      1. Routine general medical examination at a health care facility (Primary)  2. Encounter to establish care  Previous primary care physician was Dr. Luna.  His colon cancer screening is up-to-date.    3. Stage 3a chronic kidney disease (H)  Due for lab work  - Albumin Random Urine Quantitative with Creat Ratio; Future  - UA Macroscopic with reflex to Microscopic and Culture; Future  - Basic metabolic panel; Future  - Albumin Random Urine Quantitative with Creat Ratio  - UA Macroscopic with reflex to Microscopic and Culture  - Basic metabolic panel    4. COPD, mild (H)  5. Chronic obstructive pulmonary disease, unspecified COPD type (H)  Looking back at previous PFTs he has mild COPD.  Recommend tobacco cessation.  Recommend surveillance PFT.  - Spirometry, Breathing Capacity, Normal Order, Clinic Performed; Future    6. Screening for HIV (human immunodeficiency virus)  - HIV Screening; Future  - HIV Screening    7. Need for hepatitis C screening test  - Hepatitis C Screen Reflex to HCV RNA Quant and Genotype; Future  - Hepatitis C Screen Reflex to HCV RNA Quant and Genotype    8. Hodgkin's disease, nodular sclerosis of lymph nodes of multiple sites (H)  9. Nodular sclerosing Hodgkin's lymphoma, unspecified body region (H)  10. H/O stem cell transplant (H)  He follows with oncology at [facility]    11. Nicotine dependence, uncomplicated, unspecified nicotine product type  Counseled patient today on smoking cessation.    Mr. Tierney  reports that he has been smoking cigarettes. He started smoking about 40 years ago. He has a 31 pack-year smoking history. He has never used smokeless tobacco.  Patient is ready to quit, open to education.    Provided tobacco cessation resources, discussed impact and health consequences of smoking.  I spent 3 minutes counseling the patient on the benefits of quitting tobacco  use.    - [place] Quit Partner Referral; Future  - SMOKING CESSATION COUNSELING 3-10 MIN    12. Non morbid obesity due to excess calories  Recommend weight loss    13. Chemotherapy-induced neuropathy  He has tried almost everything as far as pain relief goes including gabapentin, Lyrica, amitriptyline, etc.  Nothing else really worked.    14. Lipid screening  - Lipid Profile; Future  - Lipid Profile    15. Screening for prostate cancer  - Prostate Specific Antigen Screen; Future  - Prostate Specific Antigen Screen    16. Elevated glucose level  - Hemoglobin A1c; Future  - Hemoglobin A1c    17. Strabismus  Offered referral to ophthalmology patient declined.      Patient Instructions    -- Choose a quit date (within 1 month). Quitting smoking abruptly is more successful than gradually cutting back.   -- Tell everyone about it (friends, family, coworkers)   -- Think about when you smoke the most, and what you'll do during those times (eg when in the car, work breaks, etc)   -- Stop smoking on quit date   -- Starting with quit date, use nicotine lozenges/gum as needed for cravings   -- Quit Plan   [phone] ([phone])   http://www.quitplan.[employer]   -- http://smokefree.gov/     -- Try capsaicin cream    Your BMI is Body mass index is 31.61 kg/m .  (BMI ranges: Normal 18.5 - 25, Overweight 25 - 30, Obesity greater than 30,     Morbid Obesity greater than 40 or greater than 35 with associated conditions.)    Facts about losing weight:   -- Overweight and Obesity increase your risk for developing diabetes, high blood pressure and stroke, and shorten your life.   -- 90% of weight loss comes from dietary changes, only 10% from exercise    What should I do?   -- Work on 5-10% weight loss   -- Focus on a few healthy dietary changes   -- Eat more fresh fruits and vegetables, and fewer carbohydrates   -- Cut out all calorie-containing beverages (milk, juice, alcohol, etc)   -- Exercise every day   -- Weigh yourself once a week   --  Learn about DASH Diet for dietary ways to reduce blood pressure  Google: [facility] DASH diet  [facility] site: https://www.nhlbi.nih.gov/health-topics/dash-eating-plan  PDF from [facility]: https://www.nhlbi.nih.gov/files/docs/public/heart/new_dash.pdf   -- Consider Weight Watchers (http://www.weightwatchers.[employer])   -- Consider My Fitness Pal (iOS, Android, http://www.[employer])   -- Consider time-restricted eating (eg. eating between noon and 8pm only)                 Signed, Lizandro Hamm MD, FAAP, FACP  Internal Medicine & Pediatrics      Subjective   Michael is a 54 year old, presenting for the following:  Physical and Recheck Medication        1/23/2025     3:25 PM   Additional Questions   Roomed by RUTH ANN Vergara         History of Present Illness       COPD:  He presents for follow up of COPD.  Overall, COPD symptoms are stable since last visit.  He has no fatigue or shortness of breath with exertion and no shortness of breath at rest.  He sometimes coughs and does not have change in sputum. No recent fever. He can walk greater than 2 miles without stopping to rest. He can walk 3 or more flights of stairs without resting. The patient has had no ED, urgent care, or hospital admissions because of COPD since the last visit.              Health Care Directive  Patient does not have a Health Care Directive: Discussed advance care planning with patient; however, patient declined at this time.      1/23/2025   General Health   How would you rate your overall physical health? Good   Feel stress (tense, anxious, or unable to sleep) Patient declined         1/23/2025   Nutrition   Three or more servings of calcium each day? Yes   Diet: Regular (no restrictions)   How many servings of fruit and vegetables per day? (!) 0-1   How many sweetened beverages each day? 0-1         1/23/2025   Exercise   Days per week of moderate/strenous exercise Patient declined         1/23/2025   Social Factors   Frequency of gathering with friends or relatives  Twice a week   Worry food won't last until get money to buy more No   Food not last or not have enough money for food? No   Do you have housing? (Housing is defined as stable permanent housing and does not include staying ouside in a car, in a tent, in an abandoned building, in an overnight shelter, or couch-surfing.) Yes   Are you worried about losing your housing? No   Lack of transportation? No   Unable to get utilities (heat,electricity)? No         1/23/2025   Fall Risk   Fallen 2 or more times in the past year? No   Trouble with walking or balance? Yes           1/23/2025   Dental   Dentist two times every year? (!) NO         1/23/2025   TB Screening   Were you born outside of the [place]? No         Today's PHQ-2 Score:       1/23/2025     3:21 PM   PHQ-2 ( 1999 [employer])   Q1: Little interest or pleasure in doing things 0   Q2: Feeling down, depressed or hopeless 0   PHQ-2 Score 0    Q1: Little interest or pleasure in doing things Not at all   Q2: Feeling down, depressed or hopeless Not at all   PHQ-2 Score 0       Patient-reported           1/23/2025   Substance Use   Alcohol more than 3/day or more than 7/wk Not Applicable   Do you use any other substances recreationally? (!) CANNABIS PRODUCTS     Social History     Tobacco Use    Smoking status: Some Days     Current packs/day: 0.10     Average packs/day: 0.8 packs/day for 40.1 years (31.0 ttl pk-yrs)     Types: Cigarettes     Start date: 1985    Smokeless tobacco: Never   Vaping Use    Vaping status: Never Used   Substance Use Topics    Alcohol use: Not Currently    Drug use: Yes     Types: Marijuana             1/23/2025   One time HIV Screening   Previous HIV test? I don't know         1/23/2025   STI Screening   New sexual partner(s) since last STI/HIV test? No   ASCVD Risk   The ASCVD Risk score (Dacia HE, et al., 2019) failed to calculate for the following reasons:    Cannot find a previous HDL lab    Cannot find a previous total cholesterol  "lab           Reviewed and updated as needed this visit by Provider   Tobacco  Allergies  Meds  Problems  Med Hx  Surg Hx  Fam Hx                     Objective    Exam  /70   Pulse 74   Temp 97.1  F (36.2  C) (Tympanic)   Resp 16   Ht 1.689 m (5' 6.5\")   Wt 90.2 kg (198 lb 12.8 oz)   SpO2 96%   BMI 31.61 kg/m     Estimated body mass index is 31.61 kg/m  as calculated from the following:    Height as of this encounter: 1.689 m (5' 6.5\").    Weight as of this encounter: 90.2 kg (198 lb 12.8 oz).    Physical Exam  Gen: Alert, NAD.  Neck: No carotid bruits  CV: RRR no m/r/g  Pulm: CTAB, no w/r/r. No increased work of breathing  Msk: No LE edema  Neuro: Grossly intact  Skin: No concerning lesions.  Psychiatric: Normal affect and insight. Does not appear anxious or depressed.          Signed Electronically by: Lizandro Hamm MD    "

## 2025-01-23 NOTE — PATIENT INSTRUCTIONS
-- Choose a quit date (within 1 month). Quitting smoking abruptly is more successful than gradually cutting back.   -- Tell everyone about it (friends, family, coworkers)   -- Think about when you smoke the most, and what you'll do during those times (eg when in the car, work breaks, etc)   -- Stop smoking on quit date   -- Starting with quit date, use nicotine lozenges/gum as needed for cravings   -- Quit Plan   [phone] ([id number])   http://www.quitplan.[employer]Decide   -- http://smokefree.gov/     -- Try capsaicin cream    Your BMI is Body mass index is 31.61 kg/m .  (BMI ranges: Normal 18.5 - 25, Overweight 25 - 30, Obesity greater than 30,     Morbid Obesity greater than 40 or greater than 35 with associated conditions.)    Facts about losing weight:   -- Overweight and Obesity increase your risk for developing diabetes, high blood pressure and stroke, and shorten your life.   -- 90% of weight loss comes from dietary changes, only 10% from exercise    What should I do?   -- Work on 5-10% weight loss   -- Focus on a few healthy dietary changes   -- Eat more fresh fruits and vegetables, and fewer carbohydrates   -- Cut out all calorie-containing beverages (milk, juice, alcohol, etc)   -- Exercise every day   -- Weigh yourself once a week   -- Learn about DASH Diet for dietary ways to reduce blood pressure  Google: NIH DASH diet  NIH site: https://www.nhlbi.nih.gov/health-topics/dash-eating-plan  PDF from [facility]: https://www.nhlbi.nih.gov/files/docs/public/heart/new_dash.pdf   -- Consider Weight Watchers (http://www.weightwatchers.com)   -- Consider My Fitness Pal (iOS, Android, http://www.[employer].com)   -- Consider time-restricted eating (eg. eating between noon and 8pm only)        Patient Education   Preventive Care Advice   This is general advice given by our system to help you stay healthy. However, your care team may have specific advice just for you. Please talk to your care team about your preventive care  needs.  Nutrition  Eat 5 or more servings of fruits and vegetables each day.  Try wheat bread, brown rice and whole grain pasta (instead of white bread, rice, and pasta).  Get enough calcium and vitamin D. Check the label on foods and aim for 100% of the RDA (recommended daily allowance).  Lifestyle  Exercise at least 150 minutes each week  (30 minutes a day, 5 days a week).  Do muscle strengthening activities 2 days a week. These help control your weight and prevent disease.  No smoking.  Wear sunscreen to prevent skin cancer.  Have a dental exam and cleaning every 6 months.  Yearly exams  See your health care team every year to talk about:  Any changes in your health.  Any medicines your care team has prescribed.  Preventive care, family planning, and ways to prevent chronic diseases.  Shots (vaccines)   HPV shots (up to age 26), if you've never had them before.  Hepatitis B shots (up to age 59), if you've never had them before.  COVID-19 shot: Get this shot when it's due.  Flu shot: Get a flu shot every year.  Tetanus shot: Get a tetanus shot every 10 years.  Pneumococcal, hepatitis A, and RSV shots: Ask your care team if you need these based on your risk.  Shingles shot (for age 50 and up)  General health tests  Diabetes screening:  Starting at age 35, Get screened for diabetes at least every 3 years.  If you are younger than age 35, ask your care team if you should be screened for diabetes.  Cholesterol test: At age 39, start having a cholesterol test every 5 years, or more often if advised.  Bone density scan (DEXA): At age 50, ask your care team if you should have this scan for osteoporosis (brittle bones).  Hepatitis C: Get tested at least once in your life.  STIs (sexually transmitted infections)  Before age 24: Ask your care team if you should be screened for STIs.  After age 24: Get screened for STIs if you're at risk. You are at risk for STIs (including HIV) if:  You are sexually active with more than  one person.  You don't use condoms every time.  You or a partner was diagnosed with a sexually transmitted infection.  If you are at risk for HIV, ask about PrEP medicine to prevent HIV.  Get tested for HIV at least once in your life, whether you are at risk for HIV or not.  Cancer screening tests  Cervical cancer screening: If you have a cervix, begin getting regular cervical cancer screening tests starting at age 21.  Breast cancer scan (mammogram): If you've ever had breasts, begin having regular mammograms starting at age 40. This is a scan to check for breast cancer.  Colon cancer screening: It is important to start screening for colon cancer at age 45.  Have a colonoscopy test every 10 years (or more often if you're at risk) Or, ask your provider about stool tests like a FIT test every year or Cologuard test every 3 years.  To learn more about your testing options, visit:   .  For help making a decision, visit:   https://bit.ly/ul79661.  Prostate cancer screening test: If you have a prostate, ask your care team if a prostate cancer screening test (PSA) at age 55 is right for you.  Lung cancer screening: If you are a current or former smoker ages 50 to 80, ask your care team if ongoing lung cancer screenings are right for you.  For informational purposes only. Not to replace the advice of your health care provider. Copyright   2023 [facility]. All rights reserved. Clinically reviewed by the [facility] Transitions Program. [employer] [id number] - REV 01/24.  Preventing Falls: Care Instructions  Injuries and health problems such as trouble walking or poor eyesight can increase your risk of falling. So can some medicines. But there are things you can do to help prevent falls. You can exercise to get stronger. You can also arrange your home to make it safer.    Talk to your doctor about the medicines you take. Ask if any of them increase the risk of falls and whether they can be changed or stopped.  "  Try to exercise regularly. It can help improve your strength and balance. This can help lower your risk of falling.         Practice fall safety and prevention.   Wear low-heeled shoes that fit well and give your feet good support. Talk to your doctor if you have foot problems that make this hard.  Carry a cellphone or wear a medical alert device that you can use to call for help.  Use stepladders instead of chairs to reach high objects. Don't climb if you're at risk for falls. Ask for help, if needed.  Wear the correct eyeglasses, if you need them.        Make your home safer.   Remove rugs, cords, clutter, and furniture from walkways.  Keep your house well lit. Use night-lights in hallways and bathrooms.  Install and use sturdy handrails on stairways.  Wear nonskid footwear, even inside. Don't walk barefoot or in socks without shoes.        Be safe outside.   Use handrails, curb cuts, and ramps whenever possible.  Keep your hands free by using a shoulder bag or backpack.  Try to walk in well-lit areas. Watch out for uneven ground, changes in pavement, and debris.  Be careful in the winter. Walk on the grass or gravel when sidewalks are slippery. Use de-icer on steps and walkways. Add non-slip devices to shoes.    Put grab bars and nonskid mats in your shower or tub and near the toilet. Try to use a shower chair or bath bench when bathing.   Get into a tub or shower by putting in your weaker leg first. Get out with your strong side first. Have a phone or medical alert device in the bathroom with you.   Where can you learn more?  Go to https://www.[employer].net/patiented  Enter G117 in the search box to learn more about \"Preventing Falls: Care Instructions.\"  Current as of: July 31, 2024  Content Version: 14.3    2024 [email or web address].   Care instructions adapted under license by your healthcare professional. If you have questions about a medical condition or this instruction, always ask your healthcare " professional. [employer] disclaims any warranty or liability for your use of this information.    Substance Use Disorder: Care Instructions  Overview     You can improve your life and health by stopping your use of alcohol or drugs. When you don't drink or use drugs, you may feel and sleep better. You may get along better with your family, friends, and coworkers. There are medicines and programs that can help with substance use disorder.  How can you care for yourself at home?  Here are some ways to help you stay sober and prevent relapse.  If you have been given medicine to help keep you sober or reduce your cravings, be sure to take it exactly as prescribed.  Talk to your doctor about programs that can help you stop using drugs or drinking alcohol.  Do not keep alcohol or drugs in your home.  Plan ahead. Think about what you'll say if other people ask you to drink or use drugs. Try not to spend time with people who drink or use drugs.  Use the time and money spent on drinking or drugs to do something that's important to you.  Preventing a relapse  Have a plan to deal with relapse. Learn to recognize changes in your thinking that lead you to drink or use drugs. Get help before you start to drink or use drugs again.  Try to stay away from situations, friends, or places that may lead you to drink or use drugs.  If you feel the need to drink alcohol or use drugs again, seek help right away. Call a trusted friend or family member. Some people get support from organizations such as Narcotics Anonymous or EGT or from treatment facilities.  If you relapse, get help as soon as you can. Some people make a plan with another person that outlines what they want that person to do for them if they relapse. The plan usually includes how to handle the relapse and who to notify in case of relapse.  Don't give up. Remember that a relapse doesn't mean that you have failed. Use the experience to learn the  "triggers that lead you to drink or use drugs. Then quit again. Recovery is a lifelong process. Many people have several relapses before they are able to quit for good.  Follow-up care is a key part of your treatment and safety. Be sure to make and go to all appointments, and call your doctor if you are having problems. It's also a good idea to know your test results and keep a list of the medicines you take.  When should you call for help?   Call 911  anytime you think you may need emergency care. For example, call if you or someone else:    Has overdosed or has withdrawal signs. Be sure to tell the emergency workers that you are or someone else is using or trying to quit using drugs. Overdose or withdrawal signs may include:  Losing consciousness.  Seizure.  Seeing or hearing things that aren't there (hallucinations).     Is thinking or talking about suicide or harming others.   Where to get help 24 hours a day, 7 days a week   If you or someone you know talks about suicide, self-harm, a mental health crisis, a substance use crisis, or any other kind of emotional distress, get help right away. You can:    Call the Suicide and Crisis Lifeline at 988.     Call 2-940-312-TALK (1-161.616.7591).     Text HOME to 358998 to access the Crisis Text Line.   Consider saving these numbers in your phone.  Go to tarpipe[employer] for more information or to chat online.  Call your doctor now or seek immediate medical care if:    You are having withdrawal symptoms. These may include nausea or vomiting, sweating, shakiness, and anxiety.   Watch closely for changes in your health, and be sure to contact your doctor if:    You have a relapse.     You need more help or support to stop.   Where can you learn more?  Go to https://www.[employer].net/patiented  Enter H573 in the search box to learn more about \"Substance Use Disorder: Care Instructions.\"  Current as of: November 15, 2023  Content Version: 14.3    2024 [facility] [employer]. "   Care instructions adapted under license by your healthcare professional. If you have questions about a medical condition or this instruction, always ask your healthcare professional. [employer] disclaims any warranty or liability for your use of this information.

## 2025-01-23 NOTE — NURSING NOTE
"Chief Complaint   Patient presents with    Physical    Recheck Medication       Initial /70   Pulse 74   Temp 97.1  F (36.2  C) (Tympanic)   Resp 16   Ht 1.689 m (5' 6.5\")   Wt 90.2 kg (198 lb 12.8 oz)   SpO2 96%   BMI 31.61 kg/m   Estimated body mass index is 31.61 kg/m  as calculated from the following:    Height as of this encounter: 1.689 m (5' 6.5\").    Weight as of this encounter: 90.2 kg (198 lb 12.8 oz).  Medication Review: complete    The next two questions are to help us understand your food security.  If you are feeling you need any assistance in this area, we have resources available to support you today.          1/23/2025   SDOH- Food Insecurity   Within the past 12 months, did you worry that your food would run out before you got money to buy more? N   Within the past 12 months, did the food you bought just not last and you didn t have money to get more? N         Health Care Directive:  Patient does not have a Health Care Directive: Discussed advance care planning with patient; however, patient declined at this time.    Norma J. Gosselin, LPN      "

## 2025-01-23 NOTE — LETTER
My COPD Action Plan     Name: Primo Tierney    YOB: 1970   Date: 1/23/2025    My doctor: Lizandro Hamm MD   My clinic: [facility]    [address]  [place] [address] [phone]  My Controller Medicine: none     My COPD Severity: Mild = FeV1 > 80%      Use of Oxygen: Oxygen Not Prescribed      Make sure you've had your pneumonia   vaccines.          GREEN ZONE       Doing well today    Usual level of activity and exercise  Usual amount of cough and mucus  No shortness of breath  Usual level of health (thinking clearly, sleeping well, feel like eating) Actions:    Take daily medicines  Use oxygen as prescribed  Follow regular exercise and diet plan  Avoid cigarette smoke and other irritants that harm the lungs           YELLOW ZONE          Having a bad day or flare up    Short of breath more than usual  A lot more sputum (mucus) than usual  Sputum looks yellow, green, tan, brown or bloody  More coughing or wheezing  Fever or chills  Less energy; trouble completing activities  Trouble thinking or focusing  Using quick relief inhaler or nebulizer more often  Poor sleep; symptoms wake me up  Do not feel like eating Actions:    Get plenty of rest  Take daily medicines  Use quick relief inhaler every na hours  If you use oxygen, call you doctor to see if you should adjust your oxygen  Do breathing exercises or other things to help you relax  Let a loved one, friend or neighbor know you are feeling worse  Call your care team if you have 2 or more symptoms.  Start taking steroids or antibiotics if directed by your care team           RED ZONE       Need medical care now    Severe shortness of breath (feel you can't breathe)  Fever, chills  Not enough breath to do any activity  Trouble coughing up mucus, walking or talking  Blood in mucus  Frequent coughing Rescue medicines are not working  Not able to sleep because of breathing  Feel confused or drowsy  Chest  pain    Actions:    Call your health care team.  If you cannot reach your care team, call 911 or go to the emergency room.        Annual Reminders:  Meet with Care Team, Flu Shot every Fall  Pharmacy: [id number] - [place], [place] - [address] [place] [address]

## 2025-03-10 ENCOUNTER — APPOINTMENT (OUTPATIENT)
Dept: CT IMAGING | Facility: OTHER | Age: 55
End: 2025-03-10
Payer: COMMERCIAL

## 2025-03-10 ENCOUNTER — HOSPITAL ENCOUNTER (EMERGENCY)
Facility: OTHER | Age: 55
Discharge: OTHER INSTITUTION | End: 2025-03-10
Payer: COMMERCIAL

## 2025-03-10 ENCOUNTER — APPOINTMENT (OUTPATIENT)
Dept: GENERAL RADIOLOGY | Facility: OTHER | Age: 55
End: 2025-03-10
Payer: COMMERCIAL

## 2025-03-10 VITALS
BODY MASS INDEX: 32.14 KG/M2 | HEIGHT: 66 IN | DIASTOLIC BLOOD PRESSURE: 83 MMHG | SYSTOLIC BLOOD PRESSURE: 118 MMHG | WEIGHT: 200 LBS | RESPIRATION RATE: 18 BRPM | OXYGEN SATURATION: 99 % | HEART RATE: 66 BPM

## 2025-03-10 DIAGNOSIS — I21.4 NSTEMI (NON-ST ELEVATED MYOCARDIAL INFARCTION) (H): ICD-10-CM

## 2025-03-10 DIAGNOSIS — R07.9 CHEST PAIN, UNSPECIFIED TYPE: ICD-10-CM

## 2025-03-10 DIAGNOSIS — R00.1 SYMPTOMATIC BRADYCARDIA: ICD-10-CM

## 2025-03-10 LAB
ALBUMIN SERPL BCG-MCNC: 4.6 G/DL (ref 3.5–5.2)
ALP SERPL-CCNC: 89 U/L (ref 40–150)
ALT SERPL W P-5'-P-CCNC: 41 U/L (ref 0–70)
ANION GAP SERPL CALCULATED.3IONS-SCNC: 9 MMOL/L (ref 7–15)
AST SERPL W P-5'-P-CCNC: 35 U/L (ref 0–45)
BASE EXCESS BLDV CALC-SCNC: 1.4 MMOL/L (ref -3–3)
BASOPHILS # BLD AUTO: 0 10E3/UL (ref 0–0.2)
BASOPHILS NFR BLD AUTO: 0 %
BILIRUB SERPL-MCNC: 0.3 MG/DL
BUN SERPL-MCNC: 19.1 MG/DL (ref 6–20)
CALCIUM SERPL-MCNC: 9.6 MG/DL (ref 8.8–10.4)
CHLORIDE SERPL-SCNC: 104 MMOL/L (ref 98–107)
CREAT SERPL-MCNC: 1.16 MG/DL (ref 0.67–1.17)
D DIMER PPP FEU-MCNC: <=0.27 UG/ML FEU (ref 0–0.5)
EGFRCR SERPLBLD CKD-EPI 2021: 74 ML/MIN/1.73M2
EOSINOPHIL # BLD AUTO: 0.1 10E3/UL (ref 0–0.7)
EOSINOPHIL NFR BLD AUTO: 1 %
ERYTHROCYTE [DISTWIDTH] IN BLOOD BY AUTOMATED COUNT: 12.9 % (ref 10–15)
GLUCOSE SERPL-MCNC: 108 MG/DL (ref 70–99)
HCO3 BLDV-SCNC: 27 MMOL/L (ref 21–28)
HCO3 SERPL-SCNC: 24 MMOL/L (ref 22–29)
HCT VFR BLD AUTO: 43 % (ref 40–53)
HGB BLD-MCNC: 14.8 G/DL (ref 13.3–17.7)
HOLD SPECIMEN: NORMAL
IMM GRANULOCYTES # BLD: 0.1 10E3/UL
IMM GRANULOCYTES NFR BLD: 1 %
LACTATE SERPL-SCNC: 1 MMOL/L (ref 0.7–2)
LYMPHOCYTES # BLD AUTO: 1.3 10E3/UL (ref 0.8–5.3)
LYMPHOCYTES NFR BLD AUTO: 14 %
MCH RBC QN AUTO: 30 PG (ref 26.5–33)
MCHC RBC AUTO-ENTMCNC: 34.4 G/DL (ref 31.5–36.5)
MCV RBC AUTO: 87 FL (ref 78–100)
MONOCYTES # BLD AUTO: 0.7 10E3/UL (ref 0–1.3)
MONOCYTES NFR BLD AUTO: 8 %
NEUTROPHILS # BLD AUTO: 6.7 10E3/UL (ref 1.6–8.3)
NEUTROPHILS NFR BLD AUTO: 76 %
NRBC # BLD AUTO: 0 10E3/UL
NRBC BLD AUTO-RTO: 0 /100
NT-PROBNP SERPL-MCNC: 248 PG/ML (ref 0–900)
O2/TOTAL GAS SETTING VFR VENT: 21 %
OXYHGB MFR BLDV: 68 % (ref 70–75)
PCO2 BLDV: 44 MM HG (ref 40–50)
PH BLDV: 7.4 [PH] (ref 7.32–7.43)
PLATELET # BLD AUTO: 244 10E3/UL (ref 150–450)
PO2 BLDV: 36 MM HG (ref 25–47)
POTASSIUM SERPL-SCNC: 4.2 MMOL/L (ref 3.4–5.3)
PROT SERPL-MCNC: 6.5 G/DL (ref 6.4–8.3)
RBC # BLD AUTO: 4.93 10E6/UL (ref 4.4–5.9)
SAO2 % BLDV: 69.5 % (ref 70–75)
SODIUM SERPL-SCNC: 137 MMOL/L (ref 135–145)
TROPONIN T SERPL HS-MCNC: 85 NG/L
TROPONIN T SERPL HS-MCNC: 87 NG/L
WBC # BLD AUTO: 8.8 10E3/UL (ref 4–11)

## 2025-03-10 PROCEDURE — 96361 HYDRATE IV INFUSION ADD-ON: CPT

## 2025-03-10 PROCEDURE — 99285 EMERGENCY DEPT VISIT HI MDM: CPT | Mod: 25

## 2025-03-10 PROCEDURE — 99285 EMERGENCY DEPT VISIT HI MDM: CPT

## 2025-03-10 PROCEDURE — 85379 FIBRIN DEGRADATION QUANT: CPT

## 2025-03-10 PROCEDURE — 83880 ASSAY OF NATRIURETIC PEPTIDE: CPT

## 2025-03-10 PROCEDURE — 250N000013 HC RX MED GY IP 250 OP 250 PS 637

## 2025-03-10 PROCEDURE — 250N000011 HC RX IP 250 OP 636

## 2025-03-10 PROCEDURE — 93010 ELECTROCARDIOGRAM REPORT: CPT | Mod: 76 | Performed by: INTERNAL MEDICINE

## 2025-03-10 PROCEDURE — 83605 ASSAY OF LACTIC ACID: CPT

## 2025-03-10 PROCEDURE — 82310 ASSAY OF CALCIUM: CPT

## 2025-03-10 PROCEDURE — 71045 X-RAY EXAM CHEST 1 VIEW: CPT

## 2025-03-10 PROCEDURE — 93005 ELECTROCARDIOGRAM TRACING: CPT | Mod: 76

## 2025-03-10 PROCEDURE — 82805 BLOOD GASES W/O2 SATURATION: CPT

## 2025-03-10 PROCEDURE — 85004 AUTOMATED DIFF WBC COUNT: CPT

## 2025-03-10 PROCEDURE — 71275 CT ANGIOGRAPHY CHEST: CPT

## 2025-03-10 PROCEDURE — 250N000009 HC RX 250

## 2025-03-10 PROCEDURE — 93005 ELECTROCARDIOGRAM TRACING: CPT

## 2025-03-10 PROCEDURE — 84484 ASSAY OF TROPONIN QUANT: CPT

## 2025-03-10 PROCEDURE — 258N000003 HC RX IP 258 OP 636

## 2025-03-10 PROCEDURE — 96374 THER/PROPH/DIAG INJ IV PUSH: CPT | Mod: XU

## 2025-03-10 PROCEDURE — 36415 COLL VENOUS BLD VENIPUNCTURE: CPT

## 2025-03-10 RX ORDER — IOPAMIDOL 755 MG/ML
100 INJECTION, SOLUTION INTRAVASCULAR ONCE
Status: COMPLETED | OUTPATIENT
Start: 2025-03-10 | End: 2025-03-10

## 2025-03-10 RX ORDER — HEPARIN SODIUM 10000 [USP'U]/100ML
0-5000 INJECTION, SOLUTION INTRAVENOUS CONTINUOUS
Status: DISCONTINUED | OUTPATIENT
Start: 2025-03-10 | End: 2025-03-10 | Stop reason: HOSPADM

## 2025-03-10 RX ORDER — ASPIRIN 81 MG/1
324 TABLET, CHEWABLE ORAL ONCE
Status: COMPLETED | OUTPATIENT
Start: 2025-03-10 | End: 2025-03-10

## 2025-03-10 RX ADMIN — SODIUM CHLORIDE 90 ML: 9 INJECTION, SOLUTION INTRAVENOUS at 12:59

## 2025-03-10 RX ADMIN — IOPAMIDOL 100 ML: 755 INJECTION, SOLUTION INTRAVENOUS at 12:59

## 2025-03-10 RX ADMIN — SODIUM CHLORIDE 1000 ML: 9 INJECTION, SOLUTION INTRAVENOUS at 11:48

## 2025-03-10 RX ADMIN — HEPARIN SODIUM 1100 UNITS/HR: 10000 INJECTION, SOLUTION INTRAVENOUS at 13:52

## 2025-03-10 RX ADMIN — ASPIRIN 324 MG: 81 TABLET, CHEWABLE ORAL at 11:47

## 2025-03-10 ASSESSMENT — ACTIVITIES OF DAILY LIVING (ADL)
ADLS_ACUITY_SCORE: 41

## 2025-03-10 ASSESSMENT — ENCOUNTER SYMPTOMS
BACK PAIN: 1
LIGHT-HEADEDNESS: 1

## 2025-03-10 NOTE — ED NOTES
Writer received a call from [facility][note 3] stating they wouldn't be able to transfer pt until 6pm, they are looking for mutual aid.

## 2025-03-10 NOTE — ED TRIAGE NOTES
Pt presents with chest beginning 3 days ago.  Pt rates pain 1/10 and at times worsens to 7/10.  Denies SOB.  Pt reports bradycardia at home.     Triage Assessment (Adult)       Row Name 03/10/25 1048          Triage Assessment    Airway WDL WDL        Respiratory WDL    Respiratory WDL WDL        Skin Circulation/Temperature WDL    Skin Circulation/Temperature WDL WDL        Cardiac WDL    Cardiac WDL WDL        Peripheral/Neurovascular WDL    Peripheral Neurovascular WDL WDL        Cognitive/Neuro/Behavioral WDL    Cognitive/Neuro/Behavioral WDL WDL

## 2025-03-10 NOTE — ED PROVIDER NOTES
History     Chief Complaint   Patient presents with    Chest Pain    Palpitations            HPI  Primo Tierney is a 55 year old male who presents with his mother with chest pain that radiates into his back. The intermittent chest pains started a few days ago. The chest pain is not going away. Today while experiencing chest pain he noted on his home oxygen saturation his heart rate was 47. He experienced slight nausea and lightheadedness with the chest pain that quickly resolved. Denies SOB. No edema. Tobacco use.     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     Allergies:  Allergies   Allergen Reactions    Bendamustine Anaphylaxis    Adhesive Tape Dermatitis and Itching     Use op site dressing for PAC    Atovaquone Itching and Other (See Comments)     Rash. Chills within 3 hrs of first dose    Cephalexin Nausea and Vomiting     Per patient      Ciprofloxacin Diarrhea    Erythromycin Nausea    Levofloxacin Diarrhea    Morphine Nausea and Vomiting     Can take with antinausea meds      Sulfamethoxazole-Trimethoprim Other (See Comments) and Rash     Rash, fever, chills, Hospitalized 2021 for reaction       Problem List:    Patient Active Problem List    Diagnosis Date Noted    Non morbid obesity due to excess calories 01/23/2025     Priority: Medium    Chemotherapy-induced neuropathy 01/23/2025     Priority: Medium    Strabismus 01/23/2025     Priority: Medium    H/O adenomatous polyp of colon 04/10/2023     Priority: Medium    H/O stem cell transplant (H) 04/03/2023     Priority: Medium    Lymphopenia 03/13/2021     Priority: Medium    Stage 3a chronic kidney disease (H) 03/13/2021     Priority: Medium    Lymphadenopathy, hilar 09/15/2014     Priority: Medium    Nodular sclerosing Hodgkin's lymphoma (H) 09/15/2014     Priority: Medium     Formatting of this note might be different from  the original.  2/2012 s/p chemotherapy  Formatting of this note might be different from the original.  Formatting of this note might be different from the original.  2/2012 s/p chemotherapy      Gynecomastia 01/28/2013     Priority: Medium    Neuropathy 04/17/2012     Priority: Medium     Formatting of this note might be different from the original.  Secondary to chemotherapy  Located in feet, some in hands  Treated with gabapentin      COPD, mild (H) 03/26/2012     Priority: Medium    Tobacco use disorder 01/30/2012     Priority: Medium    Hodgkin's disease, nodular sclerosis of lymph nodes of multiple sites (H) 01/09/2012     Priority: Medium     Formatting of this note might be different from the original.  Nondiagnostic FNA.   Excisional biopsy 2/1/12 with nodular sclerosis classical Hodgkin lymphoma.   Negative bilateral bone marrow biopsies 2/10/12.   Started ABVD 2/15/12.   Port 2/28/12.   [facility] treatment in [place], followed by stem cell transplant in 2014.      PAC (premature atrial contraction) 01/09/2012     Priority: Medium        Past Medical History:    Past Medical History:   Diagnosis Date    History of peripheral stem cell transplant (H)     Hodgkin lymphoma, nodular sclerosis (H)     Neuropathy        Past Surgical History:    Past Surgical History:   Procedure Laterality Date    BIOPSY/REMOVAL, LYMPH NODE(S)      x2    CHOLECYSTECTOMY      COLONOSCOPY N/A 04/10/2023    F/U 2028 tubular adenomas    IR LYMPH NODE BIOPSY  1/20/2012    PORTACATH PLACEMENT      Has been removed. Was for chemo       Family History:    Family History   Problem Relation Age of Onset    Heart Surgery Mother         x4    Uterine Cancer Sister     Melanoma Maternal Grandmother        Social History:  Marital Status:  Single [1]  Social History     Tobacco Use    Smoking status: Some Days     Current packs/day: 0.10     Average packs/day: 0.8 packs/day for 40.2 years (31.0 ttl pk-yrs)     Types: Cigarettes     Start date: 1985  "   Smokeless tobacco: Never   Vaping Use    Vaping status: Never Used   Substance Use Topics    Alcohol use: Not Currently    Drug use: Yes     Types: Marijuana        Medications:    acetaminophen (TYLENOL) 500 MG tablet  atorvastatin (LIPITOR) 10 MG tablet      Review of Systems   Cardiovascular:  Positive for chest pain.   Musculoskeletal:  Positive for back pain.   Neurological:  Positive for light-headedness.   All other systems reviewed and are negative.      Physical Exam   BP: 117/77  Pulse: 73  Resp: 19  Height: 167.6 cm (5' 6\")  Weight: 90.7 kg (200 lb)  SpO2: 97 %    Physical Exam  Vitals and nursing note reviewed.   Constitutional:       Appearance: He is obese.   HENT:      Head: Normocephalic.   Cardiovascular:      Rate and Rhythm: Normal rate and regular rhythm.      Heart sounds: Normal heart sounds.   Pulmonary:      Effort: Pulmonary effort is normal.      Breath sounds: Decreased air movement present. Decreased breath sounds present.   Abdominal:      General: Bowel sounds are normal.      Palpations: Abdomen is soft.   Musculoskeletal:         General: Normal range of motion.      Cervical back: Normal range of motion and neck supple.      Right lower leg: No edema.      Left lower leg: No edema.   Skin:     General: Skin is warm and dry.   Neurological:      General: No focal deficit present.      Mental Status: He is alert and oriented to person, place, and time.   Psychiatric:         Mood and Affect: Mood normal.            Results for orders placed or performed during the hospital encounter of 03/10/25 (from the past 24 hours)   [facility] Draw *Canceled*    Narrative    The following orders were created for panel order [place] Draw.  Procedure                               Abnormality         Status                     ---------                               -----------         ------                       Please view results for these tests on the individual orders.   CBC with platelets " differential    Narrative    The following orders were created for panel order CBC with platelets differential.  Procedure                               Abnormality         Status                     ---------                               -----------         ------                     CBC with platelets and ...[[id number]]                      Final result                 Please view results for these tests on the individual orders.   D dimer quantitative   Result Value Ref Range    D-Dimer Quantitative <=0.27 0.00 - 0.50 ug/mL FEU    Narrative    This D-dimer assay is intended for use in conjunction with a clinical pretest probability assessment model to exclude pulmonary embolism (PE) and deep venous thrombosis (DVT) in outpatients suspected of PE or DVT. The cut-off value is 0.50 ug/mL FEU.    For patients 50 years of age or older, the application of age-adjusted cut-off values for D-Dimer may increase the specificity without significant effect on sensitivity. The literature suggested calculation age adjusted cut-off in ug/L = age in years x 10 ug/L. The results in this laboratory are reported as ug/mL rather than ug/L. The calculation for age adjusted cut off in ug/mL= age in years x 0.01 ug/mL. For example, the cut off for a 76 year old male is 76 x 0.01 ug/mL = 0.76 ug/mL (760 ug/L).    M Mi et al. Age adjusted D-dimer cut-off levels to rule out pulmonary embolism: The ADJUST-PE Study. KRYS 2014;311:4849-8933.; HJ Oralia et al. Diagnostic accuracy of conventional or age adjusted D-dimer cutoff values in older patients with suspected venous thromboembolism. Systemic review and meta-analysis. BMJ 2013:346:f2492.   Comprehensive metabolic panel   Result Value Ref Range    Sodium 137 135 - 145 mmol/L    Potassium 4.2 3.4 - 5.3 mmol/L    Carbon Dioxide (CO2) 24 22 - 29 mmol/L    Anion Gap 9 7 - 15 mmol/L    Urea Nitrogen 19.1 6.0 - 20.0 mg/dL    Creatinine 1.16 0.67 - 1.17 mg/dL    GFR Estimate 74 >60  mL/min/1.73m2    Calcium 9.6 8.8 - 10.4 mg/dL    Chloride 104 98 - 107 mmol/L    Glucose 108 (H) 70 - 99 mg/dL    Alkaline Phosphatase 89 40 - 150 U/L    AST 35 0 - 45 U/L    ALT 41 0 - 70 U/L    Protein Total 6.5 6.4 - 8.3 g/dL    Albumin 4.6 3.5 - 5.2 g/dL    Bilirubin Total 0.3 <=1.2 mg/dL   Lactic acid whole blood with 1x repeat in 2 hr when >2   Result Value Ref Range    Lactic Acid, Initial 1.0 0.7 - 2.0 mmol/L   Troponin T, High Sensitivity   Result Value Ref Range    Troponin T, High Sensitivity 85 (H) <=22 ng/L   Blood gas venous   Result Value Ref Range    pH Venous 7.40 7.32 - 7.43    pCO2 Venous 44 40 - 50 mm Hg    pO2 Venous 36 25 - 47 mm Hg    Bicarbonate Venous 27 21 - 28 mmol/L    Base Excess/Deficit Venous 1.4 -3.0 - 3.0 mmol/L    FIO2 21     Oxyhemoglobin Venous 68 (L) 70 - 75 %    O2 Sat, Venous 69.5 (L) 70.0 - 75.0 %    Narrative    In healthy individuals, oxyhemoglobin (O2Hb) and oxygen saturation (SO2) are approximately equal. In the presence of dyshemoglobins, oxyhemoglobin can be considerably lower than oxygen saturation.   Nt probnp inpatient (BNP)   Result Value Ref Range    N terminal Pro BNP Inpatient 248 0 - 900 pg/mL   CBC with platelets and differential   Result Value Ref Range    WBC Count 8.8 4.0 - 11.0 10e3/uL    RBC Count 4.93 4.40 - 5.90 10e6/uL    Hemoglobin 14.8 13.3 - 17.7 g/dL    Hematocrit 43.0 40.0 - 53.0 %    MCV 87 78 - 100 fL    MCH 30.0 26.5 - 33.0 pg    MCHC 34.4 31.5 - 36.5 g/dL    RDW 12.9 10.0 - 15.0 %    Platelet Count 244 150 - 450 10e3/uL    % Neutrophils 76 %    % Lymphocytes 14 %    % Monocytes 8 %    % Eosinophils 1 %    % Basophils 0 %    % Immature Granulocytes 1 %    NRBCs per 100 WBC 0 <1 /100    Absolute Neutrophils 6.7 1.6 - 8.3 10e3/uL    Absolute Lymphocytes 1.3 0.8 - 5.3 10e3/uL    Absolute Monocytes 0.7 0.0 - 1.3 10e3/uL    Absolute Eosinophils 0.1 0.0 - 0.7 10e3/uL    Absolute Basophils 0.0 0.0 - 0.2 10e3/uL    Absolute Immature Granulocytes 0.1  <=0.4 10e3/uL    Absolute NRBCs 0.0 10e3/uL   [place] Draw    Narrative    The following orders were created for panel order [place] Draw.  Procedure                               Abnormality         Status                     ---------                               -----------         ------                     Extra Red Top Tube[[id number]]                              Final result                 Please view results for these tests on the individual orders.   Extra Red Top Tube   Result Value Ref Range    Hold Specimen x    XR Chest Port 1 View    Narrative    PROCEDURE:  XR CHEST PORT 1 VIEW    HISTORY: chest pain, SOB    COMPARISON: PET/CT 6/12/2024    FINDINGS: Single view chest radiograph    Cardiomediastinal silhouette is within normal limits.  No focal consolidation, effusion or pneumothorax.  Normal variant  azygous lobe.  No suspicious osseous lesion or subdiaphragmatic free air.      Impression    IMPRESSION:    No acute findings.    KARLA HART MD         SYSTEM ID:  [id number]   CTA Chest with Contrast    Narrative    Exam: CTA CHEST WITH CONTRAST.    Exam reason: chest pain radiating into his back, r/o aortic  catastrophe    Technique: Using helical technique, CT angiogram images of the chest  were performed during the arterial phase. Coronal and sagittal  reconstructions of the chest performed. 3D reconstructions of the  thoracic aorta performed. This CT was performed using one or more of  the following dose reduction techniques: automated exposure control,  adjustment of the mA and/or kV according to patient size, and/or use  of iterative reconstruction technique.    Meds/Contrast: 100 ml isovue 370    Comparison: 6/12/2024, 5/3/2022     Findings:     Chest:     Thoracic aorta: No aortic aneurysm or aortic dissection.     Other cardiovascular structures: No pulmonary embolism. The main  pulmonary artery is normal caliber..    Lungs: There is a 3 mm pulmonary nodule in the right upper  lobe,  unchanged. No mass or consolidation. There is dependent atelectasis.   Willa/mediastinum: No adenopathy or mass.   Pleura: No pleural effusion. No pneumothorax.  Chest wall/axilla: Mild bilateral gynecomastia.     Visualized portions of the upper abdomen: Diffusely decreased  attenuation of the liver compatible with hepatic steatosis.   Musculoskeletal: No acute osseous abnormality.       Impression    IMPRESSION:    No aortic aneurysm or aortic dissection.    Hepatic steatosis.    BRANDIE NESBITT MD         SYSTEM ID:  [id number]   EKG 12-lead, tracing only   Result Value Ref Range    Systolic Blood Pressure  mmHg    Diastolic Blood Pressure  mmHg    Ventricular Rate 62 BPM    Atrial Rate 62 BPM    SC Interval 96 ms    QRS Duration 134 ms     ms    QTc 456 ms    P Axis -13 degrees    R AXIS 89 degrees    T Axis 39 degrees    Interpretation ECG       Sinus rhythm with short SC  Right bundle branch block  Abnormal ECG  When compared with ECG of 10-Mar-2025 11:04, (unconfirmed)  No significant change was found     Troponin T, High Sensitivity   Result Value Ref Range    Troponin T, High Sensitivity 87 (H) <=22 ng/L   EKG 12-lead, tracing only   Result Value Ref Range    Systolic Blood Pressure  mmHg    Diastolic Blood Pressure  mmHg    Ventricular Rate 63 BPM    Atrial Rate 63 BPM    SC Interval 72 ms    QRS Duration 134 ms     ms    QTc 460 ms    P Axis -5 degrees    R AXIS 89 degrees    T Axis 29 degrees    Interpretation ECG       Sinus rhythm with short SC  Right bundle branch block  Abnormal ECG  When compared with ECG of 10-Mar-2025 13:20, (unconfirmed)  No significant change was found         Medications   heparin 25,000 units in 0.45% NaCl 250 mL ANTICOAGULANT infusion (1,100 Units/hr Intravenous $New Bag 3/10/25 1352)   sodium chloride 0.9% BOLUS 1,000 mL (0 mLs Intravenous Stopped 3/10/25 1338)   aspirin (ASA) chewable tablet 324 mg (324 mg Oral $Given 3/10/25 1147)   heparin loading dose for  "LOW INTENSITY TREATMENT * Give BEFORE starting heparin infusion (5,450 Units Intravenous $Given 3/10/25 3641)   iopamidol (ISOVUE-370) solution 100 mL (100 mLs Intravenous $Given 3/10/25 1259)   sodium chloride 0.9 % bag 500 mL for CT scan flush use (90 mLs Intravenous $Given 3/10/25 1259)       Assessments & Plan (with Medical Decision Making)  Primo Tierney is a 55 year old male who presents with his mother with chest pain that radiates into his back. The intermittent chest pains started a few days ago. The chest pain is not going away. Today while experiencing chest pain he noted on his home oxygen saturation his heart rate was 47. He experienced slight nausea and lightheadedness with the chest pain that quickly resolved. Denies SOB. No edema. Tobacco use.   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   VS in the ED. /85   Pulse 63   Resp (!) 8   Ht 1.676 m (5' 6\")   Wt 90.7 kg (200 lb)   SpO2 99%   BMI 32.28 kg/m    Diagnostics:    Lab: Troponin- 85.     X-ray: CXR- No acute findings.     CT scan:   CTA chest w/contrast- No aortic aneurysm or aortic dissection. Hepatic steatosis.    EKG  Rhythm- sinus rhythm with short [place]  Rate- 68  Axis  Any abnormal   No significant changes when compared to previous EKG.   I have independently reviewed and interpreted today's EKG, pending cardiologist over read.    ED Course as of 03/10/25 1430   Mon Mar 10, 2025   1328 HR down into the 30's and dizzy. Repeat EKG with no changes. Awaiting repeat troponin and CTA results. Heparin drip is ordered. Awaiting call from [facility].    1400 I spoke with Dr. Allen, [facility] who graciously accepts the pt for transfer.      Patient Vitals for the past 24 hrs:   BP Pulse Resp SpO2 Height Weight   03/10/25 1415 118/85 63 (!) 8 99 % -- --   03/10/25 1400 119/81 59 18 100 % -- --   03/10/25 " "1345 116/76 59 (!) 9 100 % -- --   03/10/25 1330 122/88 62 (!) 8 100 % -- --   03/10/25 1315 122/47 65 12 100 % -- --   03/10/25 1245 115/75 61 16 99 % -- --   03/10/25 1215 123/79 61 15 98 % -- --   03/10/25 1200 103/72 64 19 98 % -- --   03/10/25 1145 114/77 66 18 96 % -- --   03/10/25 1130 121/76 65 19 97 % -- --   03/10/25 1115 107/68 66 -- 96 % -- --   03/10/25 1100 117/78 68 -- 96 % -- --   03/10/25 1048 117/77 -- -- -- -- --   03/10/25 1047 -- 67 -- 97 % 1.676 m (5' 6\") 90.7 kg (200 lb)   03/10/25 1045 117/77 73 -- -- -- --     Michael is a 56 y/o male evaluated today for chest pain concerns. Differential diagnosis considered but not limited ACS, pulmonary embolism, aortic dissection, pericarditis, esophageal rupture, and pneumothorax.   Gave aspirin and heparin.   CXR with no acute findings. EKG with no acute findings. Initial troponin 85. No previous to compare. His risk factors include obesity, tobacco use, family hx, and hyperlipidemia. Given risk factors will start low dose heparin drip. Will rule out aortic catastrophe with CT chest prior to starting the drip.   CTA chest with no acute findings. Repeat troponin is 87. Heparin drip started. Episodic symptomatic bradycardia. HR drops into the 30's pt becomes dizzy and feels an uncomfortable sensation in his chest. Bradycardia is not sustained lasting approximately 20-30 seconds and returns to 60's. No changes in repeat EKGs. I spoke with Dr. Allen at [facility] who graciously accepts the patient for transfer. Recommends bradycardia precautions en route to [facility].      I have reviewed the nursing notes.    I have reviewed the findings, diagnosis, plan and need for follow up with the patient.  Medical Decision Making  The patient's presentation was of moderate complexity (chest pain).    The patient's evaluation involved:  an assessment requiring an independent historian (see separate area of note for details)  ordering and/or review of 3+ test(s) in this " encounter (see separate area of note for details)  discussion of management or test interpretation with another health professional (see separate area of note for details)    The patient's management necessitated moderate risk (prescription drug management including medications given in the ED) and moderate risk (IV contrast administration).    Final diagnoses:   Chest pain, unspecified type   NSTEMI (non-ST elevated myocardial infarction) (H)   Symptomatic bradycardia     3/10/2025   [facility]       Vani Messer, DARA QUIÑONES  03/10/25 3100

## 2025-03-12 LAB
ATRIAL RATE - MUSE: 62 BPM
ATRIAL RATE - MUSE: 63 BPM
ATRIAL RATE - MUSE: 68 BPM
DIASTOLIC BLOOD PRESSURE - MUSE: NORMAL MMHG
INTERPRETATION ECG - MUSE: NORMAL
P AXIS - MUSE: -13 DEGREES
P AXIS - MUSE: -5 DEGREES
P AXIS - MUSE: 23 DEGREES
PR INTERVAL - MUSE: 72 MS
PR INTERVAL - MUSE: 80 MS
PR INTERVAL - MUSE: 96 MS
QRS DURATION - MUSE: 130 MS
QRS DURATION - MUSE: 134 MS
QRS DURATION - MUSE: 134 MS
QT - MUSE: 424 MS
QT - MUSE: 450 MS
QT - MUSE: 450 MS
QTC - MUSE: 450 MS
QTC - MUSE: 456 MS
QTC - MUSE: 460 MS
R AXIS - MUSE: 89 DEGREES
R AXIS - MUSE: 89 DEGREES
R AXIS - MUSE: 90 DEGREES
SYSTOLIC BLOOD PRESSURE - MUSE: NORMAL MMHG
T AXIS - MUSE: 29 DEGREES
T AXIS - MUSE: 39 DEGREES
T AXIS - MUSE: 46 DEGREES
VENTRICULAR RATE- MUSE: 62 BPM
VENTRICULAR RATE- MUSE: 63 BPM
VENTRICULAR RATE- MUSE: 68 BPM

## 2025-03-19 ENCOUNTER — OFFICE VISIT (OUTPATIENT)
Dept: FAMILY MEDICINE | Facility: OTHER | Age: 55
End: 2025-03-19
Attending: NURSE PRACTITIONER
Payer: COMMERCIAL

## 2025-03-19 VITALS
HEART RATE: 50 BPM | WEIGHT: 200.4 LBS | DIASTOLIC BLOOD PRESSURE: 72 MMHG | BODY MASS INDEX: 32.21 KG/M2 | HEIGHT: 66 IN | RESPIRATION RATE: 16 BRPM | SYSTOLIC BLOOD PRESSURE: 108 MMHG | TEMPERATURE: 97.1 F | OXYGEN SATURATION: 98 %

## 2025-03-19 DIAGNOSIS — J42 CHRONIC BRONCHITIS, UNSPECIFIED CHRONIC BRONCHITIS TYPE (H): ICD-10-CM

## 2025-03-19 DIAGNOSIS — N18.31 STAGE 3A CHRONIC KIDNEY DISEASE (H): ICD-10-CM

## 2025-03-19 DIAGNOSIS — R91.8 PULMONARY NODULES: ICD-10-CM

## 2025-03-19 DIAGNOSIS — C81.9A HODGKIN'S DISEASE IN REMISSION, UNSPECIFIED HODGKIN LYMPHOMA TYPE: ICD-10-CM

## 2025-03-19 DIAGNOSIS — F17.200 NICOTINE DEPENDENCE, UNCOMPLICATED, UNSPECIFIED NICOTINE PRODUCT TYPE: ICD-10-CM

## 2025-03-19 DIAGNOSIS — Z71.6 ENCOUNTER FOR TOBACCO USE CESSATION COUNSELING: ICD-10-CM

## 2025-03-19 DIAGNOSIS — I25.2 HISTORY OF NON-ST ELEVATION MYOCARDIAL INFARCTION (NSTEMI): ICD-10-CM

## 2025-03-19 DIAGNOSIS — Z13.220 SCREENING CHOLESTEROL LEVEL: ICD-10-CM

## 2025-03-19 DIAGNOSIS — Z09 HOSPITAL DISCHARGE FOLLOW-UP: Primary | ICD-10-CM

## 2025-03-19 PROCEDURE — 99406 BEHAV CHNG SMOKING 3-10 MIN: CPT | Performed by: NURSE PRACTITIONER

## 2025-03-19 PROCEDURE — G0463 HOSPITAL OUTPT CLINIC VISIT: HCPCS | Mod: 25

## 2025-03-19 PROCEDURE — 99496 TRANSJ CARE MGMT HIGH F2F 7D: CPT | Performed by: NURSE PRACTITIONER

## 2025-03-19 RX ORDER — NITROGLYCERIN 0.4 MG/1
0.4 TABLET SUBLINGUAL
COMMUNITY
Start: 2025-03-12

## 2025-03-19 RX ORDER — ROSUVASTATIN CALCIUM 20 MG/1
20 TABLET, COATED ORAL
COMMUNITY
Start: 2025-03-11

## 2025-03-19 RX ORDER — ASPIRIN 81 MG/1
81 TABLET, CHEWABLE ORAL
COMMUNITY
Start: 2025-03-12

## 2025-03-19 ASSESSMENT — PAIN SCALES - GENERAL: PAINLEVEL_OUTOF10: NO PAIN (0)

## 2025-03-19 NOTE — PATIENT INSTRUCTIONS
Follow up as scheduled with cardiology. The lipoprotein level that was pending at time of discharge was normal which is good.     Start cardiac rehab on 3/25 as planned.   Continue with your current medication regimen. I did place a future order to re-check cholesterol levels in 6-8 weeks - make a lab only appointment for this. Cardiology can also follow this if they prefer, goal is to have your LDL < 70, if it is greater than 70 at time of recheck then medication adjustments/add a new medication is recommended.     We do not need to do anything different about the nodule noted in your lungs; this has been noted previously and unchanged in size.   Quitting Tobacco: Care Instructions  Quitting tobacco is much harder than simply changing a habit. Nicotine cravings make it hard to quit, but you can do it. Your doctor will help you set up the plan that best meets your needs.    You will miss the nicotine at first. You may feel short-tempered and grumpy. You may have trouble sleeping or thinking clearly. The urge to use tobacco may continue for a time.   Combining tools can raise your chances of success. You can use medicine along with counseling. And you can join a quit-tobacco program, such as the American [employer] Association's Freedom from Smoking program.     Get support.  Reach out to family and friends, and find a counselor to help you quit. Join a support group, such as Nicotine Anonymous. Go to www.smokefree.gov to sign up for text messaging support.     Talk to your doctor or pharmacist about medicines that can help you quit.  Medicines can help with cravings and withdrawal symptoms. There are several over-the-counter choices, such as nicotine patches, gum, and lozenges.     After you quit, do not use tobacco again, not even once.  Get rid of all tobacco products and anything that reminds you of tobacco, such as ashtrays.     Avoid things that make you reach for tobacco.  Change your daily routine. Take a different  "route to work, or eat a meal in a different place.     Try to cut down on stress.  Find ways to calm yourself, such as taking a hot bath or doing deep breathing exercises.     Eat a healthy diet, and get regular exercise.  Having healthy habits may help you quit using tobacco.     Don't give up on quitting if you use tobacco again.  Most people quit and restart a few times before they quit for good.   Follow-up care is a key part of your treatment and safety. Be sure to make and go to all appointments, and call your doctor if you are having problems. It's also a good idea to know your test results and keep a list of the medicines you take.  Where can you learn more?  Go to https://www.[employer].net/patiented  Enter Y522 in the search box to learn more about \"Quitting Tobacco: Care Instructions.\"  Current as of: August 20, 2024  Content Version: 14.4    2676-7054 [employer].   Care instructions adapted under license by your healthcare professional. If you have questions about a medical condition or this instruction, always ask your healthcare professional. [employer] disclaims any warranty or liability for your use of this information.    "

## 2025-03-19 NOTE — PROGRESS NOTES
Assessment & Plan   Problem List Items Addressed This Visit       Stage 3a chronic kidney disease (H) (Chronic)     Other Visit Diagnoses       Hospital discharge follow-up    -  Primary    History of non-ST elevation myocardial infarction (NSTEMI)        Screening cholesterol level        Relevant Orders    Lipid Panel    Pulmonary nodules        Hodgkin's disease in remission, unspecified Hodgkin lymphoma type        Chronic bronchitis, unspecified chronic bronchitis type (H)        Encounter for tobacco use cessation counseling        Nicotine dependence, uncomplicated, unspecified nicotine product type        Relevant Orders    [place] Quit Partner Referral    SMOKING CESSATION COUNSELING 3-10 MIN (Completed)           1. Hospital discharge follow-up (Primary)  2. History of non-ST elevation myocardial infarction (NSTEMI)  Discharge summary reviewed. Patient is doing well. Upcoming appointments for cardiology and cardiac rehab were previously set up. Follow up with them as scheduled. Continue with current medication regimen; asa 81 daily, rosuvastatin with goal of LDL < 70, continue with BRILINA for DAPT for one year unless otherwise directed by cardiology.     3. Screening cholesterol level  - Lipid Panel; Future  Future order placed.   Continue with current dose of rosuvastatin 20 mg nightly    4. Pulmonary nodules  Present prior to admission; no additional follow up or workup is indicated. These are stable and have been previously noted on past PET scans and other advanced imaging. Follows with oncology regularly.     5. Hodgkin's disease in remission, unspecified Hodgkin lymphoma type  Follows with oncology regularly.     6. Stage 3a chronic kidney disease (H)  Stable     7. Chronic bronchitis, unspecified chronic bronchitis type (H)  Continue with tobacco cessation goal; do not resume smoking.     8. Encounter for tobacco use cessation counseling  He has quit smoking since his recent hospitalization     9.  "Nicotine dependence, uncomplicated, unspecified nicotine product type  - [place] Quit Partner Referral; Future  - SMOKING CESSATION COUNSELING 3-10 MIN  A total of 3 minutes was spent conversing about the importance of smoking cessation and options in case he feels he needs additional help. He has not smoked since hospital discharge.      MED REC REQUIRED  Post Medication Reconciliation Status: discharge medications reconciled, continue medications without change  BMI  Estimated body mass index is 32.35 kg/m  as calculated from the following:    Height as of this encounter: 1.676 m (5' 6\").    Weight as of this encounter: 90.9 kg (200 lb 6.4 oz).   Weight management plan: Discussed healthy diet and exercise guidelines    Work on weight loss  Regular exercise    No follow-ups on file.      Cristofer Rodriguez is a 55 year old, presenting for the following health issues:  Hospital F/U    History of Present Illness       Vascular Disease:  He presents for follow up of vascular disease.     He never takes nitroglycerin. He takes daily aspirin.    He eats 0-1 servings of fruits and vegetables daily.He consumes 1 sweetened beverage(s) daily.He exercises with enough effort to increase his heart rate 10 to 19 minutes per day.  He exercises with enough effort to increase his heart rate 5 days per week.   He is taking medications regularly.          Hospital Follow-up Visit:    Hospital/Nursing Home/IP Rehab Facility:  [facility]    Was the patient in the ICU or did the patient experience delirium during hospitalization?  No  Do you have any other stressors you would like to discuss with your provider? No    Problems taking medications regularly:  None  Medication changes since discharge: Start 81 mg Aspirin, Ticagrellor 90 mg BID, Rosuvastatin 20 mg at bedtime, and Nitroglycerin PRN   Problems adhering to non-medication therapy:  None    Summary of hospitalization:  [facility] discharge summary " "reviewed  Diagnostic Tests/Treatments reviewed.  Follow up needed: cardiology visit upcoming; cardiac rehab. I have reviewed results of lipoprotein which was pending at time of hospital discharge   Needs repeat lipid panel in 6-8 weeks. Ordered placed today.   Other Healthcare Providers Involved in Patient s Care:         Specialist appointment - cardiology and cardiac rehab  Update since discharge: improved.       Plan of care communicated with patient     Michael presents today for a hospital follow up following a recent NSTEMI where he was hospitalized at [facility]. A stent was placed. Since discharge he is asymptomatic, doing well. Cardiac rehab and cardiology appointments have been scheduled. He denies any chest pain and is taking medications as they were prescribed.               Review of Systems  Constitutional, neuro, ENT, endocrine, pulmonary, cardiac, gastrointestinal, genitourinary, musculoskeletal, integument and psychiatric systems are negative, except as otherwise noted.      Objective    /72   Pulse 50   Temp 97.1  F (36.2  C) (Tympanic)   Resp 16   Ht 1.676 m (5' 6\")   Wt 90.9 kg (200 lb 6.4 oz)   SpO2 98%   BMI 32.35 kg/m    Body mass index is 32.35 kg/m .  Physical Exam  Vitals and nursing note reviewed.   Constitutional:       General: He is not in acute distress.     Appearance: Normal appearance. He is normal weight. He is not ill-appearing or toxic-appearing.   Cardiovascular:      Rate and Rhythm: Normal rate and regular rhythm.      Heart sounds: Normal heart sounds. No murmur heard.  Pulmonary:      Effort: Pulmonary effort is normal. No respiratory distress.      Breath sounds: Normal breath sounds. No stridor. No wheezing, rhonchi or rales.   Chest:      Chest wall: No tenderness.   Musculoskeletal:         General: Normal range of motion.      Right lower leg: No edema.      Left lower leg: No edema.   Skin:     General: Skin is warm and dry.   Neurological:      General: No " focal deficit present.      Mental Status: He is alert and oriented to person, place, and time.   Psychiatric:         Mood and Affect: Mood normal.                Signed Electronically by: Kendra Mackenzie NP

## 2025-03-19 NOTE — NURSING NOTE
"Chief Complaint   Patient presents with    Hospital F/U       Initial /72   Pulse 50   Temp 97.1  F (36.2  C) (Tympanic)   Resp 16   Ht 1.676 m (5' 6\")   Wt 90.9 kg (200 lb 6.4 oz)   SpO2 98%   BMI 32.35 kg/m   Estimated body mass index is 32.35 kg/m  as calculated from the following:    Height as of this encounter: 1.676 m (5' 6\").    Weight as of this encounter: 90.9 kg (200 lb 6.4 oz).  Medication Review: complete    The next two questions are to help us understand your food security.  If you are feeling you need any assistance in this area, we have resources available to support you today.          1/23/2025   SDOH- Food Insecurity   Within the past 12 months, did you worry that your food would run out before you got money to buy more? N   Within the past 12 months, did the food you bought just not last and you didn t have money to get more? N         Health Care Directive:  Patient does not have a Health Care Directive: Discussed advance care planning with patient; however, patient declined at this time.    Kendra Knight, ISELA      "

## 2025-05-14 ENCOUNTER — RESULTS FOLLOW-UP (OUTPATIENT)
Dept: FAMILY MEDICINE | Facility: OTHER | Age: 55
End: 2025-05-14

## 2025-05-14 ENCOUNTER — LAB (OUTPATIENT)
Dept: LAB | Facility: OTHER | Age: 55
End: 2025-05-14
Payer: COMMERCIAL

## 2025-05-14 DIAGNOSIS — Z13.220 SCREENING CHOLESTEROL LEVEL: ICD-10-CM

## 2025-05-14 LAB
CHOLEST SERPL-MCNC: 98 MG/DL
FASTING STATUS PATIENT QL REPORTED: YES
HDLC SERPL-MCNC: 36 MG/DL
LDLC SERPL CALC-MCNC: 33 MG/DL
NONHDLC SERPL-MCNC: 62 MG/DL
TRIGL SERPL-MCNC: 146 MG/DL

## 2025-05-14 PROCEDURE — 80061 LIPID PANEL: CPT | Mod: ZL

## 2025-05-14 PROCEDURE — 36415 COLL VENOUS BLD VENIPUNCTURE: CPT | Mod: ZL

## 2025-06-11 ENCOUNTER — HOSPITAL ENCOUNTER (OUTPATIENT)
Dept: PET IMAGING | Facility: OTHER | Age: 55
Discharge: HOME | End: 2025-06-11
Attending: INTERNAL MEDICINE
Payer: COMMERCIAL

## 2025-06-11 DIAGNOSIS — D70.9 NEUTROPENIC FEVER: ICD-10-CM

## 2025-06-11 DIAGNOSIS — C81.18: ICD-10-CM

## 2025-06-11 DIAGNOSIS — R50.81 NEUTROPENIC FEVER: ICD-10-CM

## 2025-06-11 DIAGNOSIS — Z94.84 STEM CELLS TRANSPLANT STATUS (H): ICD-10-CM

## 2025-06-11 PROCEDURE — A9552 F18 FDG: HCPCS | Performed by: RADIOLOGY

## 2025-06-11 PROCEDURE — 78815 PET IMAGE W/CT SKULL-THIGH: CPT | Mod: PS

## 2025-06-11 PROCEDURE — 78815 PET IMAGE W/CT SKULL-THIGH: CPT | Mod: 26 | Performed by: RADIOLOGY

## 2025-06-11 PROCEDURE — 343N000001 HC RX 343 MED OP 636: Performed by: RADIOLOGY

## 2025-06-11 RX ORDER — FLUDEOXYGLUCOSE F 18 200 MCI/ML
11.12 INJECTION, SOLUTION INTRAVENOUS ONCE
Status: COMPLETED | OUTPATIENT
Start: 2025-06-11 | End: 2025-06-11

## 2025-06-11 RX ADMIN — FLUDEOXYGLUCOSE F 18 11.12 MILLICURIE: 200 INJECTION, SOLUTION INTRAVENOUS at 15:55

## 2025-06-16 ENCOUNTER — LAB (OUTPATIENT)
Dept: LAB | Facility: OTHER | Age: 55
End: 2025-06-16
Attending: INTERNAL MEDICINE
Payer: COMMERCIAL

## 2025-06-16 DIAGNOSIS — Z94.84 STEM CELLS TRANSPLANT STATUS (H): ICD-10-CM

## 2025-06-16 DIAGNOSIS — D70.9 NEUTROPENIA WITH FEVER: ICD-10-CM

## 2025-06-16 DIAGNOSIS — R50.81 NEUTROPENIC FEVER: ICD-10-CM

## 2025-06-16 DIAGNOSIS — R50.81 NEUTROPENIA WITH FEVER: ICD-10-CM

## 2025-06-16 DIAGNOSIS — C81.18: ICD-10-CM

## 2025-06-16 DIAGNOSIS — D70.9 NEUTROPENIC FEVER: ICD-10-CM

## 2025-06-16 LAB
ALP SERPL-CCNC: 74 U/L (ref 40–150)
AST SERPL W P-5'-P-CCNC: 30 U/L (ref 0–45)
BASOPHILS # BLD AUTO: 0 10E3/UL (ref 0–0.2)
BASOPHILS NFR BLD AUTO: 0 %
BILIRUB SERPL-MCNC: 0.4 MG/DL
CALCIUM SERPL-MCNC: 9.2 MG/DL (ref 8.8–10.4)
CREAT SERPL-MCNC: 1.19 MG/DL (ref 0.67–1.17)
EGFRCR SERPLBLD CKD-EPI 2021: 72 ML/MIN/1.73M2
EOSINOPHIL # BLD AUTO: 0.1 10E3/UL (ref 0–0.7)
EOSINOPHIL NFR BLD AUTO: 2 %
ERYTHROCYTE [DISTWIDTH] IN BLOOD BY AUTOMATED COUNT: 13.1 % (ref 10–15)
HCT VFR BLD AUTO: 42.7 % (ref 40–53)
HGB BLD-MCNC: 14.2 G/DL (ref 13.3–17.7)
IMM GRANULOCYTES # BLD: 0 10E3/UL
IMM GRANULOCYTES NFR BLD: 0 %
LDH SERPL L TO P-CCNC: 185 U/L (ref 0–250)
LYMPHOCYTES # BLD AUTO: 1.4 10E3/UL (ref 0.8–5.3)
LYMPHOCYTES NFR BLD AUTO: 21 %
MCH RBC QN AUTO: 29.8 PG (ref 26.5–33)
MCHC RBC AUTO-ENTMCNC: 33.3 G/DL (ref 31.5–36.5)
MCV RBC AUTO: 90 FL (ref 78–100)
MONOCYTES # BLD AUTO: 0.6 10E3/UL (ref 0–1.3)
MONOCYTES NFR BLD AUTO: 9 %
NEUTROPHILS # BLD AUTO: 4.5 10E3/UL (ref 1.6–8.3)
NEUTROPHILS NFR BLD AUTO: 68 %
NRBC # BLD AUTO: 0 10E3/UL
NRBC BLD AUTO-RTO: 0 /100
PLATELET # BLD AUTO: 222 10E3/UL (ref 150–450)
POTASSIUM SERPL-SCNC: 4.3 MMOL/L (ref 3.4–5.3)
RBC # BLD AUTO: 4.77 10E6/UL (ref 4.4–5.9)
WBC # BLD AUTO: 6.7 10E3/UL (ref 4–11)

## 2025-06-16 PROCEDURE — 82565 ASSAY OF CREATININE: CPT | Mod: ZL

## 2025-06-16 PROCEDURE — 84075 ASSAY ALKALINE PHOSPHATASE: CPT | Mod: ZL

## 2025-06-16 PROCEDURE — 83615 LACTATE (LD) (LDH) ENZYME: CPT | Mod: ZL

## 2025-06-16 PROCEDURE — 84132 ASSAY OF SERUM POTASSIUM: CPT | Mod: ZL

## 2025-06-16 PROCEDURE — 36415 COLL VENOUS BLD VENIPUNCTURE: CPT | Mod: ZL

## 2025-06-16 PROCEDURE — 84450 TRANSFERASE (AST) (SGOT): CPT | Mod: ZL

## 2025-06-16 PROCEDURE — 82247 BILIRUBIN TOTAL: CPT | Mod: ZL

## 2025-06-16 PROCEDURE — 85004 AUTOMATED DIFF WBC COUNT: CPT | Mod: ZL

## 2025-06-16 PROCEDURE — 82310 ASSAY OF CALCIUM: CPT | Mod: ZL

## (undated) DEVICE — SOL WATER 1500ML

## (undated) DEVICE — ESU ENDO FORCEP BX HOT FD-210U

## (undated) DEVICE — ESU GROUND PAD ADULT W/CORD E7507

## (undated) DEVICE — TUBING SUCTION 10'X3/16" N510

## (undated) DEVICE — ENDO TRAP POLYP E-TRAP 00711099

## (undated) DEVICE — SUCTION MANIFOLD NEPTUNE 2 SYS 4 PORT 0702-020-000

## (undated) DEVICE — ENDO KIT COMPLIANCE DYKENDOCMPLY

## (undated) DEVICE — ENDO SNARE POLYPECTOMY CRESENT 20MM LOOP SD-221U-25

## (undated) DEVICE — ENDO BRUSH CHANNEL MASTER CLEANING 2-4.2MM BW-412T

## (undated) RX ORDER — HEPARIN SODIUM 10000 [USP'U]/100ML
INJECTION, SOLUTION INTRAVENOUS
Status: DISPENSED
Start: 2025-03-10

## (undated) RX ORDER — ASPIRIN 81 MG/1
TABLET, CHEWABLE ORAL
Status: DISPENSED
Start: 2025-03-10